# Patient Record
Sex: MALE | Race: WHITE | NOT HISPANIC OR LATINO | Employment: OTHER | URBAN - METROPOLITAN AREA
[De-identification: names, ages, dates, MRNs, and addresses within clinical notes are randomized per-mention and may not be internally consistent; named-entity substitution may affect disease eponyms.]

---

## 2020-01-14 ENCOUNTER — TRANSCRIBE ORDERS (OUTPATIENT)
Dept: ADMINISTRATIVE | Facility: HOSPITAL | Age: 65
End: 2020-01-14

## 2020-01-14 ENCOUNTER — APPOINTMENT (OUTPATIENT)
Dept: LAB | Facility: HOSPITAL | Age: 65
DRG: 948 | End: 2020-01-14
Payer: MEDICARE

## 2020-01-14 ENCOUNTER — HOSPITAL ENCOUNTER (OUTPATIENT)
Dept: CT IMAGING | Facility: HOSPITAL | Age: 65
Discharge: HOME | DRG: 948 | End: 2020-01-14
Payer: MEDICARE

## 2020-01-14 ENCOUNTER — APPOINTMENT (EMERGENCY)
Dept: RADIOLOGY | Facility: HOSPITAL | Age: 65
DRG: 948 | End: 2020-01-14
Payer: MEDICARE

## 2020-01-14 ENCOUNTER — HOSPITAL ENCOUNTER (INPATIENT)
Facility: HOSPITAL | Age: 65
LOS: 3 days | Discharge: NURSING, REHAB OR LONG-TERM CARE | DRG: 948 | End: 2020-01-17
Attending: EMERGENCY MEDICINE | Admitting: FAMILY MEDICINE
Payer: MEDICARE

## 2020-01-14 DIAGNOSIS — R07.9 CHEST PAIN, UNSPECIFIED TYPE: ICD-10-CM

## 2020-01-14 DIAGNOSIS — R53.1 GENERALIZED WEAKNESS: ICD-10-CM

## 2020-01-14 DIAGNOSIS — N18.30 CRD (CHRONIC RENAL DISEASE), STAGE III (HCC): Primary | ICD-10-CM

## 2020-01-14 DIAGNOSIS — I71.2 ASCENDING AORTIC ANEURYSM (HCC): ICD-10-CM

## 2020-01-14 DIAGNOSIS — R07.9 CHEST PAIN, UNSPECIFIED TYPE: Primary | ICD-10-CM

## 2020-01-14 DIAGNOSIS — R77.8 ELEVATED TROPONIN: ICD-10-CM

## 2020-01-14 DIAGNOSIS — I67.2 CEREBROVASCULAR DISEASE, ARTERIOSCLEROTIC, POST-STROKE: ICD-10-CM

## 2020-01-14 DIAGNOSIS — N18.30 CRD (CHRONIC RENAL DISEASE), STAGE III (HCC): ICD-10-CM

## 2020-01-14 DIAGNOSIS — R07.9 CHEST PAIN: Primary | ICD-10-CM

## 2020-01-14 DIAGNOSIS — N18.9 CHRONIC KIDNEY DISEASE: ICD-10-CM

## 2020-01-14 DIAGNOSIS — E78.5 DYSLIPIDEMIA, GOAL LDL BELOW 160: ICD-10-CM

## 2020-01-14 DIAGNOSIS — Z86.73 CEREBROVASCULAR DISEASE, ARTERIOSCLEROTIC, POST-STROKE: ICD-10-CM

## 2020-01-14 DIAGNOSIS — R07.9 CHEST PAIN, UNSPECIFIED: ICD-10-CM

## 2020-01-14 PROBLEM — F02.80: Status: ACTIVE | Noted: 2018-08-28

## 2020-01-14 LAB
ALBUMIN SERPL BCP-MCNC: 3.9 G/DL (ref 3.5–5)
ALP SERPL-CCNC: 142 U/L (ref 46–116)
ALT SERPL W P-5'-P-CCNC: 21 U/L (ref 12–78)
ALT SERPL W P-5'-P-CCNC: 23 U/L (ref 12–78)
ANION GAP SERPL CALCULATED.3IONS-SCNC: 9 MMOL/L (ref 4–13)
APTT PPP: 31 SECONDS (ref 23–37)
AST SERPL W P-5'-P-CCNC: 13 U/L (ref 5–45)
BASOPHILS # BLD AUTO: 0.02 THOUSANDS/ΜL (ref 0–0.1)
BASOPHILS # BLD AUTO: 0.02 THOUSANDS/ΜL (ref 0–0.1)
BASOPHILS NFR BLD AUTO: 0 % (ref 0–1)
BASOPHILS NFR BLD AUTO: 0 % (ref 0–1)
BILIRUB SERPL-MCNC: 0.82 MG/DL (ref 0.2–1)
BUN SERPL-MCNC: 23 MG/DL (ref 5–25)
BUN SERPL-MCNC: 23 MG/DL (ref 5–25)
CALCIUM SERPL-MCNC: 8.3 MG/DL (ref 8.3–10.1)
CHLORIDE SERPL-SCNC: 103 MMOL/L (ref 100–108)
CHOLEST SERPL-MCNC: 179 MG/DL (ref 50–200)
CO2 SERPL-SCNC: 28 MMOL/L (ref 21–32)
CREAT SERPL-MCNC: 1.25 MG/DL (ref 0.6–1.3)
CREAT SERPL-MCNC: 1.47 MG/DL (ref 0.6–1.3)
EOSINOPHIL # BLD AUTO: 0.11 THOUSAND/ΜL (ref 0–0.61)
EOSINOPHIL # BLD AUTO: 0.12 THOUSAND/ΜL (ref 0–0.61)
EOSINOPHIL NFR BLD AUTO: 2 % (ref 0–6)
EOSINOPHIL NFR BLD AUTO: 2 % (ref 0–6)
ERYTHROCYTE [DISTWIDTH] IN BLOOD BY AUTOMATED COUNT: 12.9 % (ref 11.6–15.1)
ERYTHROCYTE [DISTWIDTH] IN BLOOD BY AUTOMATED COUNT: 13.1 % (ref 11.6–15.1)
EST. AVERAGE GLUCOSE BLD GHB EST-MCNC: 123 MG/DL
FLUAV RNA NPH QL NAA+PROBE: NORMAL
FLUBV RNA NPH QL NAA+PROBE: NORMAL
GFR SERPL CREATININE-BSD FRML MDRD: 50 ML/MIN/1.73SQ M
GFR SERPL CREATININE-BSD FRML MDRD: 61 ML/MIN/1.73SQ M
GLUCOSE SERPL-MCNC: 99 MG/DL (ref 65–140)
HBA1C MFR BLD: 5.9 % (ref 4.2–6.3)
HCT VFR BLD AUTO: 38.7 % (ref 36.5–49.3)
HCT VFR BLD AUTO: 38.8 % (ref 36.5–49.3)
HDLC SERPL-MCNC: 39 MG/DL
HGB BLD-MCNC: 13 G/DL (ref 12–17)
HGB BLD-MCNC: 13.1 G/DL (ref 12–17)
IMM GRANULOCYTES # BLD AUTO: 0.01 THOUSAND/UL (ref 0–0.2)
IMM GRANULOCYTES # BLD AUTO: 0.02 THOUSAND/UL (ref 0–0.2)
IMM GRANULOCYTES NFR BLD AUTO: 0 % (ref 0–2)
IMM GRANULOCYTES NFR BLD AUTO: 0 % (ref 0–2)
INR PPP: 0.93 (ref 0.84–1.19)
LDLC SERPL CALC-MCNC: 114 MG/DL (ref 0–100)
LYMPHOCYTES # BLD AUTO: 0.97 THOUSANDS/ΜL (ref 0.6–4.47)
LYMPHOCYTES # BLD AUTO: 1.21 THOUSANDS/ΜL (ref 0.6–4.47)
LYMPHOCYTES NFR BLD AUTO: 21 % (ref 14–44)
LYMPHOCYTES NFR BLD AUTO: 21 % (ref 14–44)
MCH RBC QN AUTO: 29.4 PG (ref 26.8–34.3)
MCH RBC QN AUTO: 29.7 PG (ref 26.8–34.3)
MCHC RBC AUTO-ENTMCNC: 33.6 G/DL (ref 31.4–37.4)
MCHC RBC AUTO-ENTMCNC: 33.8 G/DL (ref 31.4–37.4)
MCV RBC AUTO: 88 FL (ref 82–98)
MCV RBC AUTO: 88 FL (ref 82–98)
MONOCYTES # BLD AUTO: 0.47 THOUSAND/ΜL (ref 0.17–1.22)
MONOCYTES # BLD AUTO: 0.51 THOUSAND/ΜL (ref 0.17–1.22)
MONOCYTES NFR BLD AUTO: 10 % (ref 4–12)
MONOCYTES NFR BLD AUTO: 9 % (ref 4–12)
NEUTROPHILS # BLD AUTO: 2.98 THOUSANDS/ΜL (ref 1.85–7.62)
NEUTROPHILS # BLD AUTO: 3.88 THOUSANDS/ΜL (ref 1.85–7.62)
NEUTS SEG NFR BLD AUTO: 67 % (ref 43–75)
NEUTS SEG NFR BLD AUTO: 68 % (ref 43–75)
NONHDLC SERPL-MCNC: 140 MG/DL
NRBC BLD AUTO-RTO: 0 /100 WBCS
NRBC BLD AUTO-RTO: 0 /100 WBCS
PLATELET # BLD AUTO: 227 THOUSANDS/UL (ref 149–390)
PLATELET # BLD AUTO: 234 THOUSANDS/UL (ref 149–390)
PMV BLD AUTO: 9.8 FL (ref 8.9–12.7)
PMV BLD AUTO: 9.9 FL (ref 8.9–12.7)
POTASSIUM SERPL-SCNC: 3.6 MMOL/L (ref 3.5–5.3)
PROT SERPL-MCNC: 7.9 G/DL (ref 6.4–8.2)
PROTHROMBIN TIME: 12.5 SECONDS (ref 11.6–14.5)
RBC # BLD AUTO: 4.41 MILLION/UL (ref 3.88–5.62)
RBC # BLD AUTO: 4.42 MILLION/UL (ref 3.88–5.62)
RSV RNA NPH QL NAA+PROBE: NORMAL
SODIUM SERPL-SCNC: 140 MMOL/L (ref 136–145)
TRIGL SERPL-MCNC: 128 MG/DL
TROPONIN I SERPL-MCNC: 0.56 NG/ML
TROPONIN I SERPL-MCNC: 0.57 NG/ML
TROPONIN I SERPL-MCNC: 0.62 NG/ML
WBC # BLD AUTO: 4.57 THOUSAND/UL (ref 4.31–10.16)
WBC # BLD AUTO: 5.75 THOUSAND/UL (ref 4.31–10.16)

## 2020-01-14 PROCEDURE — 84460 ALANINE AMINO (ALT) (SGPT): CPT

## 2020-01-14 PROCEDURE — 85610 PROTHROMBIN TIME: CPT | Performed by: EMERGENCY MEDICINE

## 2020-01-14 PROCEDURE — 85730 THROMBOPLASTIN TIME PARTIAL: CPT | Performed by: EMERGENCY MEDICINE

## 2020-01-14 PROCEDURE — 99285 EMERGENCY DEPT VISIT HI MDM: CPT

## 2020-01-14 PROCEDURE — 99222 1ST HOSP IP/OBS MODERATE 55: CPT | Performed by: FAMILY MEDICINE

## 2020-01-14 PROCEDURE — 36415 COLL VENOUS BLD VENIPUNCTURE: CPT

## 2020-01-14 PROCEDURE — 93005 ELECTROCARDIOGRAM TRACING: CPT

## 2020-01-14 PROCEDURE — 85025 COMPLETE CBC W/AUTO DIFF WBC: CPT

## 2020-01-14 PROCEDURE — 83036 HEMOGLOBIN GLYCOSYLATED A1C: CPT

## 2020-01-14 PROCEDURE — 80053 COMPREHEN METABOLIC PANEL: CPT | Performed by: EMERGENCY MEDICINE

## 2020-01-14 PROCEDURE — 82565 ASSAY OF CREATININE: CPT

## 2020-01-14 PROCEDURE — 84484 ASSAY OF TROPONIN QUANT: CPT | Performed by: FAMILY MEDICINE

## 2020-01-14 PROCEDURE — 85025 COMPLETE CBC W/AUTO DIFF WBC: CPT | Performed by: EMERGENCY MEDICINE

## 2020-01-14 PROCEDURE — 99285 EMERGENCY DEPT VISIT HI MDM: CPT | Performed by: EMERGENCY MEDICINE

## 2020-01-14 PROCEDURE — 80061 LIPID PANEL: CPT

## 2020-01-14 PROCEDURE — 84520 ASSAY OF UREA NITROGEN: CPT

## 2020-01-14 PROCEDURE — 71045 X-RAY EXAM CHEST 1 VIEW: CPT

## 2020-01-14 PROCEDURE — 84484 ASSAY OF TROPONIN QUANT: CPT | Performed by: EMERGENCY MEDICINE

## 2020-01-14 PROCEDURE — 84484 ASSAY OF TROPONIN QUANT: CPT

## 2020-01-14 PROCEDURE — 71275 CT ANGIOGRAPHY CHEST: CPT

## 2020-01-14 PROCEDURE — 87631 RESP VIRUS 3-5 TARGETS: CPT | Performed by: FAMILY MEDICINE

## 2020-01-14 RX ORDER — ASPIRIN 325 MG
1 TABLET ORAL
COMMUNITY
Start: 2019-10-31

## 2020-01-14 RX ORDER — VERAPAMIL HYDROCHLORIDE 300 MG/1
CAPSULE, EXTENDED RELEASE ORAL
COMMUNITY
Start: 2019-03-22

## 2020-01-14 RX ORDER — SERTRALINE HYDROCHLORIDE 100 MG/1
100 TABLET, FILM COATED ORAL DAILY
Status: DISCONTINUED | OUTPATIENT
Start: 2020-01-15 | End: 2020-01-17 | Stop reason: HOSPADM

## 2020-01-14 RX ORDER — ASPIRIN 81 MG/1
324 TABLET, CHEWABLE ORAL ONCE
Status: DISCONTINUED | OUTPATIENT
Start: 2020-01-14 | End: 2020-01-14

## 2020-01-14 RX ORDER — OXYBUTYNIN CHLORIDE 5 MG/1
5 TABLET, EXTENDED RELEASE ORAL DAILY
Status: DISCONTINUED | OUTPATIENT
Start: 2020-01-15 | End: 2020-01-17 | Stop reason: HOSPADM

## 2020-01-14 RX ORDER — MAGNESIUM HYDROXIDE/ALUMINUM HYDROXICE/SIMETHICONE 120; 1200; 1200 MG/30ML; MG/30ML; MG/30ML
30 SUSPENSION ORAL EVERY 6 HOURS PRN
Status: DISCONTINUED | OUTPATIENT
Start: 2020-01-14 | End: 2020-01-17 | Stop reason: HOSPADM

## 2020-01-14 RX ORDER — TAMSULOSIN HYDROCHLORIDE 0.4 MG/1
0.4 CAPSULE ORAL DAILY
COMMUNITY
Start: 2019-12-27

## 2020-01-14 RX ORDER — VERAPAMIL HYDROCHLORIDE 120 MG/1
300 TABLET, FILM COATED ORAL
Status: DISCONTINUED | OUTPATIENT
Start: 2020-01-14 | End: 2020-01-17 | Stop reason: HOSPADM

## 2020-01-14 RX ORDER — FAMOTIDINE 20 MG/1
20 TABLET, FILM COATED ORAL 2 TIMES DAILY
COMMUNITY
Start: 2020-01-07

## 2020-01-14 RX ORDER — ACETAMINOPHEN 325 MG/1
650 TABLET ORAL EVERY 6 HOURS PRN
Status: DISCONTINUED | OUTPATIENT
Start: 2020-01-14 | End: 2020-01-17 | Stop reason: HOSPADM

## 2020-01-14 RX ORDER — SERTRALINE HYDROCHLORIDE 100 MG/1
100 TABLET, FILM COATED ORAL DAILY
COMMUNITY
Start: 2019-11-18

## 2020-01-14 RX ORDER — ATORVASTATIN CALCIUM 80 MG/1
80 TABLET, FILM COATED ORAL DAILY
COMMUNITY
Start: 2020-01-10

## 2020-01-14 RX ORDER — CLOPIDOGREL BISULFATE 75 MG/1
75 TABLET ORAL ONCE
Status: COMPLETED | OUTPATIENT
Start: 2020-01-14 | End: 2020-01-14

## 2020-01-14 RX ORDER — FAMOTIDINE 20 MG/1
20 TABLET, FILM COATED ORAL DAILY
Status: DISCONTINUED | OUTPATIENT
Start: 2020-01-15 | End: 2020-01-17 | Stop reason: HOSPADM

## 2020-01-14 RX ORDER — ATORVASTATIN CALCIUM 40 MG/1
80 TABLET, FILM COATED ORAL
Status: DISCONTINUED | OUTPATIENT
Start: 2020-01-15 | End: 2020-01-17 | Stop reason: HOSPADM

## 2020-01-14 RX ORDER — SIMETHICONE 80 MG
80 TABLET,CHEWABLE ORAL 4 TIMES DAILY PRN
Status: DISCONTINUED | OUTPATIENT
Start: 2020-01-14 | End: 2020-01-17 | Stop reason: HOSPADM

## 2020-01-14 RX ORDER — MELATONIN
1000 DAILY
Status: DISCONTINUED | OUTPATIENT
Start: 2020-01-15 | End: 2020-01-17 | Stop reason: HOSPADM

## 2020-01-14 RX ORDER — TAMSULOSIN HYDROCHLORIDE 0.4 MG/1
0.4 CAPSULE ORAL DAILY
Status: DISCONTINUED | OUTPATIENT
Start: 2020-01-15 | End: 2020-01-17 | Stop reason: HOSPADM

## 2020-01-14 RX ORDER — HYDROXYZINE PAMOATE 25 MG/1
25 CAPSULE ORAL 2 TIMES DAILY
COMMUNITY
Start: 2018-05-26

## 2020-01-14 RX ORDER — CLOPIDOGREL BISULFATE 75 MG/1
TABLET ORAL
Status: ON HOLD | COMMUNITY
Start: 2019-11-21 | End: 2020-01-17 | Stop reason: CLARIF

## 2020-01-14 RX ORDER — HYDROXYZINE HYDROCHLORIDE 25 MG/1
25 TABLET, FILM COATED ORAL
Status: DISCONTINUED | OUTPATIENT
Start: 2020-01-14 | End: 2020-01-17 | Stop reason: HOSPADM

## 2020-01-14 RX ORDER — ONDANSETRON 2 MG/ML
4 INJECTION INTRAMUSCULAR; INTRAVENOUS EVERY 6 HOURS PRN
Status: DISCONTINUED | OUTPATIENT
Start: 2020-01-14 | End: 2020-01-17 | Stop reason: HOSPADM

## 2020-01-14 RX ORDER — MIRABEGRON 25 MG/1
25 TABLET, FILM COATED, EXTENDED RELEASE ORAL DAILY
COMMUNITY
Start: 2019-12-09

## 2020-01-14 RX ADMIN — IOHEXOL 85 ML: 350 INJECTION, SOLUTION INTRAVENOUS at 13:51

## 2020-01-14 RX ADMIN — VERAPAMIL HYDROCHLORIDE 300 MG: 120 TABLET, FILM COATED ORAL at 22:11

## 2020-01-14 RX ADMIN — HYDROXYZINE HYDROCHLORIDE 25 MG: 25 TABLET ORAL at 22:11

## 2020-01-14 RX ADMIN — CLOPIDOGREL BISULFATE 75 MG: 75 TABLET ORAL at 21:44

## 2020-01-14 NOTE — ED PROVIDER NOTES
History  Chief Complaint   Patient presents with    Chest Pain     pt states chest pain intermittently for the past couple of weeks  pt denies chest pain at this time  pt denies SOB, dizziness  states recently had cold like symptoms  pt was at PCP today and had blood work done and was called to come to ED     Patient is a 70-year-old male with history of hypertension prior TIAs and dementia presents the emergency department after being referred by PCP for chest pain the patient was seen in the office earlier today for the same has been going on for about a week per patient he denies any chest pain today states it was worse on Saturday felt like his chest was going to explode patient has a known history of ascending aortic aneurysm  Patient had elevated troponin the outpatient setting today and was referred in for further evaluation  No prior MI cardiac catheterization or stents  No active chest pain at this time  History provided by:  Patient  Chest Pain   Pain location:  Substernal area  Pain quality: pressure    Pain severity:  Moderate  Onset quality:  Gradual  Duration:  1 week  Timing:  Intermittent  Progression:  Waxing and waning  Chronicity:  New  Associated symptoms: no abdominal pain, no cough, no dizziness, no fatigue, no fever, no headache, no nausea, no numbness, no palpitations, no shortness of breath, not vomiting and no weakness        Prior to Admission Medications   Prescriptions Last Dose Informant Patient Reported? Taking?    Cholecalciferol 25 MCG (1000 UT) capsule 1/14/2020 at Unknown time  Yes Yes   Sig: Take 1,000 Units by mouth   MYRBETRIQ 25 MG TB24 1/14/2020 at Unknown time  Yes Yes   Sig: Take 25 mg by mouth daily   Multiple Vitamins-Iron (ONE DAILY MULTIVITAMIN/IRON) TABS 1/14/2020 at Unknown time  Yes Yes   Sig: Take 1 tablet by mouth   atorvastatin (LIPITOR) 80 mg tablet 1/14/2020 at Unknown time  Yes Yes   clopidogrel (PLAVIX) 75 mg tablet 1/14/2020 at Unknown time  Yes Yes Sig: TAKE 1 TABLET BY MOUTH ONCE DAILY REPLACES ASPIRIN - STOP TAKING IT   famotidine (PEPCID) 20 mg tablet 1/14/2020 at Unknown time  Yes Yes   hydrOXYzine pamoate (VISTARIL) 25 mg capsule 1/14/2020 at Unknown time  Yes Yes   Sig: Take 25 mg by mouth   sertraline (ZOLOFT) 100 mg tablet 1/14/2020 at Unknown time  Yes Yes   Sig: Take 100 mg by mouth daily   tamsulosin (FLOMAX) 0 4 mg 1/14/2020 at Unknown time  Yes Yes   Sig: Take 0 4 mg by mouth daily   verapamil (VERELAN) 300 MG CP24 1/13/2020 at Unknown time  Yes Yes   Sig: take 1 capsule by mouth at bedtime      Facility-Administered Medications: None       Past Medical History:   Diagnosis Date    Arthritis     Hypertension        Past Surgical History:   Procedure Laterality Date    JOINT REPLACEMENT         History reviewed  No pertinent family history  I have reviewed and agree with the history as documented  Social History     Tobacco Use    Smoking status: Current Every Day Smoker    Smokeless tobacco: Never Used   Substance Use Topics    Alcohol use: Never     Frequency: Never    Drug use: Never        Review of Systems   Constitutional: Negative for activity change, appetite change, chills, fatigue and fever  HENT: Negative for congestion, ear pain, rhinorrhea and sore throat  Eyes: Negative for discharge, redness and visual disturbance  Respiratory: Negative for cough, chest tightness, shortness of breath and wheezing  Cardiovascular: Positive for chest pain  Negative for palpitations  Gastrointestinal: Negative for abdominal pain, constipation, diarrhea, nausea and vomiting  Endocrine: Negative for polydipsia and polyuria  Genitourinary: Negative for difficulty urinating, dysuria, frequency, hematuria and urgency  Musculoskeletal: Negative for arthralgias and myalgias  Skin: Negative for color change, pallor and rash  Neurological: Negative for dizziness, weakness, light-headedness, numbness and headaches  Hematological: Negative for adenopathy  Does not bruise/bleed easily  All other systems reviewed and are negative  Physical Exam  Physical Exam   Constitutional: He is oriented to person, place, and time  He appears well-developed and well-nourished  HENT:   Head: Normocephalic and atraumatic  Right Ear: External ear normal    Left Ear: External ear normal    Nose: Nose normal    Mouth/Throat: Oropharynx is clear and moist    Eyes: Pupils are equal, round, and reactive to light  Conjunctivae and EOM are normal    Neck: Normal range of motion  Neck supple  Cardiovascular: Normal rate, regular rhythm, normal heart sounds and intact distal pulses  Pulmonary/Chest: Effort normal and breath sounds normal  No respiratory distress  He has no wheezes  He has no rales  He exhibits no tenderness  Abdominal: Soft  Bowel sounds are normal  He exhibits no distension  There is no tenderness  There is no guarding  Musculoskeletal: Normal range of motion  Neurological: He is alert and oriented to person, place, and time  No cranial nerve deficit or sensory deficit  Skin: Skin is warm and dry  Psychiatric: He has a normal mood and affect  Nursing note and vitals reviewed        Vital Signs  ED Triage Vitals [01/14/20 1701]   Temperature Pulse Respirations Blood Pressure SpO2   97 8 °F (36 6 °C) 72 22 150/76 95 %      Temp Source Heart Rate Source Patient Position - Orthostatic VS BP Location FiO2 (%)   Temporal Monitor Lying Left arm --      Pain Score       No Pain           Vitals:    01/14/20 1701 01/14/20 1730   BP: 150/76    Pulse: 72 69   Patient Position - Orthostatic VS: Lying          Visual Acuity      ED Medications  Medications - No data to display    Diagnostic Studies  Results Reviewed     Procedure Component Value Units Date/Time    Troponin I [[id number]]  (Abnormal) Collected:  01/14/20 1731    Lab Status:  Final result Specimen:  Blood from Arm, Left Updated:  01/14/20 1758     Troponin I 0 56 ng/mL     Comprehensive metabolic panel [[id number]]  (Abnormal) Collected:  01/14/20 1731    Lab Status:  Final result Specimen:  Blood from Arm, Left Updated:  01/14/20 1754     Sodium 140 mmol/L      Potassium 3 6 mmol/L      Chloride 103 mmol/L      CO2 28 mmol/L      ANION GAP 9 mmol/L      BUN 23 mg/dL      Creatinine 1 47 mg/dL      Glucose 99 mg/dL      Calcium 8 3 mg/dL      AST 13 U/L      ALT 21 U/L      Alkaline Phosphatase 142 U/L      Total Protein 7 9 g/dL      Albumin 3 9 g/dL      Total Bilirubin 0 82 mg/dL      eGFR 50 ml/min/1 73sq m     Narrative:       National Kidney Disease Foundation guidelines for Chronic Kidney Disease (CKD):     Stage 1 with normal or high GFR (GFR > 90 mL/min/1 73 square meters)    Stage 2 Mild CKD (GFR = 60-89 mL/min/1 73 square meters)    Stage 3A Moderate CKD (GFR = 45-59 mL/min/1 73 square meters)    Stage 3B Moderate CKD (GFR = 30-44 mL/min/1 73 square meters)    Stage 4 Severe CKD (GFR = 15-29 mL/min/1 73 square meters)    Stage 5 End Stage CKD (GFR <15 mL/min/1 73 square meters)  Note: GFR calculation is accurate only with a steady state creatinine    Protime-INR [[id number]]  (Normal) Collected:  01/14/20 1731    Lab Status:  Final result Specimen:  Blood from Arm, Left Updated:  01/14/20 1751     Protime 12 5 seconds      INR 0 93    APTT [[id number]]  (Normal) Collected:  01/14/20 1731    Lab Status:  Final result Specimen:  Blood from Arm, Left Updated:  01/14/20 1751     PTT 31 seconds     CBC and differential [[id number]] Collected:  01/14/20 1731    Lab Status:  Final result Specimen:  Blood from Arm, Left Updated:  01/14/20 1739     WBC 4 57 Thousand/uL      RBC 4 42 Million/uL      Hemoglobin 13 0 g/dL      Hematocrit 38 7 %      MCV 88 fL      MCH 29 4 pg      MCHC 33 6 g/dL      RDW 13 1 %      MPV 9 9 fL      Platelets 508 Thousands/uL      nRBC 0 /100 WBCs      Neutrophils Relative 67 %      Immat GRANS % 0 %      Lymphocytes Relative 21 % Monocytes Relative 10 %      Eosinophils Relative 2 %      Basophils Relative 0 %      Neutrophils Absolute 2 98 Thousands/µL      Immature Grans Absolute 0 02 Thousand/uL      Lymphocytes Absolute 0 97 Thousands/µL      Monocytes Absolute 0 47 Thousand/µL      Eosinophils Absolute 0 11 Thousand/µL      Basophils Absolute 0 02 Thousands/µL     Troponin I [[id number]]     Lab Status:  No result Specimen:  Blood                  XR chest 1 view portable    (Results Pending)              Procedures  ECG 12 Lead Documentation Only  Date/Time: 1/14/2020 5:14 PM  Performed by: Rhona Marin DO  Authorized by: Rhona Marin DO     ECG reviewed by me, the ED Provider: yes    Patient location:  ED  Quality:     Tracing quality:  Limited by artifact  Rate:     ECG rate:  71    ECG rate assessment: normal    Rhythm:     Rhythm: sinus rhythm    QRS:     QRS axis:  Normal  Conduction:     Conduction: normal    ST segments:     ST segments:  Non-specific  T waves:     T waves: non-specific               ED Course  ED Course as of Jan 14 1806 Tue Jan 14, 2020   1752 Spoke with Dr Matt Torre Cardiology on-call reviewed case and findings in the emergency he recommends admitting to hospitalist here trending troponins and he will see the patient in the hospital for further evaluation and treatment  1804 Second troponin is now lower than the earlier troponin today patient has been chest pain-free all day by his reports EKG nonischemic  Spoke with Cardiology recommend admitting patient here for now trending troponin and will see for further evaluation  [place] with Dr Misha Hobbs hospitalist on-call reviewed case and findings in the emergency department and Cardiology recommendations she accepts for admission                HEART Risk Score      Most Recent Value   History  0 Filed at: 01/14/2020 1748   ECG  1 Filed at: 01/14/2020 1748   Age  1 Filed at: 01/14/2020 1748   Risk Factors  1 Filed at: 01/14/2020 9207 Troponin  2 Filed at: 01/14/2020 1748   Heart Score Risk Calculator   History  0 Filed at: 01/14/2020 1748   ECG  1 Filed at: 01/14/2020 [address]   Age  1 Filed at: 01/14/2020 1748   Risk Factors  1 Filed at: 01/14/2020 1748   Troponin  2 Filed at: 01/14/2020 1748   HEART Score  5 Filed at: 01/14/2020 1748   HEART Score  5 Filed at: 01/14/2020 1748                            MDM  Number of Diagnoses or Management Options  Ascending aortic aneurysm [facility]): new and requires workup  Chest pain: new and requires workup  Chronic kidney disease: new and requires workup  Elevated troponin: new and requires workup     Amount and/or Complexity of Data Reviewed  Clinical lab tests: ordered and reviewed  Tests in the radiology section of CPT®: ordered and reviewed  Tests in the medicine section of CPT®: ordered and reviewed  Decide to obtain previous medical records or to obtain history from someone other than the patient: yes  Review and summarize past medical records: yes  Independent visualization of images, tracings, or specimens: yes    Risk of Complications, Morbidity, and/or Mortality  Presenting problems: moderate  Diagnostic procedures: moderate  Management options: moderate    Patient Progress  Patient progress: stable        Disposition  Final diagnoses:   Chest pain   Elevated troponin   Ascending aortic aneurysm ([facility] [address] )   Chronic kidney disease     Time reflects when diagnosis was documented in both MDM as applicable and the Disposition within this note     Time User Action Codes Description Comment    1/14/2020  5:01 PM Romy Lay Add [R07 9] Chest pain     1/14/2020  5:01 PM Romy Lay Add [R79 89] Elevated troponin     1/14/2020  5:01 PM Brad Liang Add [I71 2] Ascending aortic aneurysm ([facility] [address] )     1/14/2020  5:56 PM Romy Lay Add [N18 9] Chronic kidney disease       ED Disposition     ED Disposition Condition Date/Time Comment    Admit Stable Tue Jan 14, 2020  5:01 PM Case was discussed with Dr Adia Villanueva and the patient's admission status was agreed to be Admission Status: inpatient status to the service of Dr Omar Ordonez  Follow-up Information    None         Patient's Medications   Discharge Prescriptions    No medications on file     No discharge procedures on file      ED Provider  Electronically Signed by           Nika Marmolejo DO  01/14/20 9300

## 2020-01-15 ENCOUNTER — APPOINTMENT (INPATIENT)
Dept: NON INVASIVE DIAGNOSTICS | Facility: HOSPITAL | Age: 65
DRG: 948 | End: 2020-01-15
Payer: MEDICARE

## 2020-01-15 ENCOUNTER — APPOINTMENT (INPATIENT)
Dept: CT IMAGING | Facility: HOSPITAL | Age: 65
DRG: 948 | End: 2020-01-15
Payer: MEDICARE

## 2020-01-15 PROBLEM — R53.1 GENERALIZED WEAKNESS: Status: ACTIVE | Noted: 2020-01-15

## 2020-01-15 PROBLEM — R47.01 APHASIA: Status: ACTIVE | Noted: 2020-01-15

## 2020-01-15 LAB
ALBUMIN SERPL BCP-MCNC: 3.4 G/DL (ref 3.5–5)
ALP SERPL-CCNC: 130 U/L (ref 46–116)
ALT SERPL W P-5'-P-CCNC: 18 U/L (ref 12–78)
ANION GAP SERPL CALCULATED.3IONS-SCNC: 9 MMOL/L (ref 4–13)
AST SERPL W P-5'-P-CCNC: 13 U/L (ref 5–45)
ATRIAL RATE: 71 BPM
BILIRUB SERPL-MCNC: 0.68 MG/DL (ref 0.2–1)
BUN SERPL-MCNC: 22 MG/DL (ref 5–25)
CALCIUM SERPL-MCNC: 8.1 MG/DL (ref 8.3–10.1)
CHLORIDE SERPL-SCNC: 105 MMOL/L (ref 100–108)
CO2 SERPL-SCNC: 27 MMOL/L (ref 21–32)
CREAT SERPL-MCNC: 1.25 MG/DL (ref 0.6–1.3)
ERYTHROCYTE [DISTWIDTH] IN BLOOD BY AUTOMATED COUNT: 12.9 % (ref 11.6–15.1)
GFR SERPL CREATININE-BSD FRML MDRD: 61 ML/MIN/1.73SQ M
GLUCOSE SERPL-MCNC: 100 MG/DL (ref 65–140)
HCT VFR BLD AUTO: 36.4 % (ref 36.5–49.3)
HGB BLD-MCNC: 12.4 G/DL (ref 12–17)
MAGNESIUM SERPL-MCNC: 1.9 MG/DL (ref 1.6–2.6)
MCH RBC QN AUTO: 29.5 PG (ref 26.8–34.3)
MCHC RBC AUTO-ENTMCNC: 34.1 G/DL (ref 31.4–37.4)
MCV RBC AUTO: 87 FL (ref 82–98)
P AXIS: 22 DEGREES
PLATELET # BLD AUTO: 204 THOUSANDS/UL (ref 149–390)
PMV BLD AUTO: 9.8 FL (ref 8.9–12.7)
POTASSIUM SERPL-SCNC: 3.4 MMOL/L (ref 3.5–5.3)
PR INTERVAL: 132 MS
PROT SERPL-MCNC: 7.2 G/DL (ref 6.4–8.2)
QRS AXIS: 32 DEGREES
QRSD INTERVAL: 92 MS
QT INTERVAL: 410 MS
QTC INTERVAL: 445 MS
RBC # BLD AUTO: 4.21 MILLION/UL (ref 3.88–5.62)
SODIUM SERPL-SCNC: 141 MMOL/L (ref 136–145)
T WAVE AXIS: 67 DEGREES
TROPONIN I SERPL-MCNC: 0.56 NG/ML
VENTRICULAR RATE: 71 BPM
WBC # BLD AUTO: 5.54 THOUSAND/UL (ref 4.31–10.16)

## 2020-01-15 PROCEDURE — 99232 SBSQ HOSP IP/OBS MODERATE 35: CPT | Performed by: PHYSICIAN ASSISTANT

## 2020-01-15 PROCEDURE — 70450 CT HEAD/BRAIN W/O DYE: CPT

## 2020-01-15 PROCEDURE — 80053 COMPREHEN METABOLIC PANEL: CPT | Performed by: FAMILY MEDICINE

## 2020-01-15 PROCEDURE — 85027 COMPLETE CBC AUTOMATED: CPT | Performed by: FAMILY MEDICINE

## 2020-01-15 PROCEDURE — 93308 TTE F-UP OR LMTD: CPT

## 2020-01-15 PROCEDURE — 36415 COLL VENOUS BLD VENIPUNCTURE: CPT | Performed by: FAMILY MEDICINE

## 2020-01-15 PROCEDURE — 83735 ASSAY OF MAGNESIUM: CPT | Performed by: FAMILY MEDICINE

## 2020-01-15 RX ADMIN — ENOXAPARIN SODIUM 40 MG: 40 INJECTION SUBCUTANEOUS at 08:16

## 2020-01-15 RX ADMIN — SERTRALINE HYDROCHLORIDE 100 MG: 100 TABLET ORAL at 08:18

## 2020-01-15 RX ADMIN — ZINC 1 TABLET: TAB ORAL at 09:01

## 2020-01-15 RX ADMIN — OXYBUTYNIN CHLORIDE 5 MG: 5 TABLET, EXTENDED RELEASE ORAL at 08:19

## 2020-01-15 RX ADMIN — TAMSULOSIN HYDROCHLORIDE 0.4 MG: 0.4 CAPSULE ORAL at 08:18

## 2020-01-15 RX ADMIN — HYDROXYZINE HYDROCHLORIDE 25 MG: 25 TABLET ORAL at 22:20

## 2020-01-15 RX ADMIN — VITAMIN D, TAB 1000IU (100/BT) 1000 UNITS: 25 TAB at 08:19

## 2020-01-15 RX ADMIN — FAMOTIDINE 20 MG: 20 TABLET ORAL at 08:18

## 2020-01-15 RX ADMIN — VERAPAMIL HYDROCHLORIDE 300 MG: 120 TABLET, FILM COATED ORAL at 22:21

## 2020-01-15 RX ADMIN — ATORVASTATIN CALCIUM 80 MG: 40 TABLET, FILM COATED ORAL at 15:56

## 2020-01-15 NOTE — H&P
H&P- Delia Edge 1955, 62 y o  male MRN: [id number]    Unit/Bed#: ED 01 Encounter: [id number]    Primary Care Provider: Benitez Schmid MD   Date and time admitted to hospital: 1/14/2020  4:53 PM        * Chest pain  Assessment & Plan  · Patient gives history of 2-3 weeks of intermittent chest tightness  History obtained from his significant other who reported that chest tightness started since Saturday as for a she is aware of  · Patient was evaluated by PCP today noted to have slightly elevated troponin as into the emergency room  · No chest pain currently  · Emergency room discussed with the cardiologist-trend troponin  · Obtain echocardiogram  · Patient with risk factors including previous history of CVA PID hypertension and hyperlipidemia    Elevated troponin  Assessment & Plan  · Patient had elevated troponin as an outpatient  Troponin was elevated at 0 6 done as an outpatient and was sent to the emergency room    The most recent troponin is 0 57  · Trend troponin  · Telemetry  · Cardiology consultation  · EKG reviewed  · Repeat EKG in the morning    Hypertension  Assessment & Plan  · Patient is on verapamil  · Monitor blood pressure    Stage 3 chronic kidney disease ([facility] [address] )  Assessment & Plan  · Known history of CKD stage III  · Monitor creatinine    Dyslipidemia, goal LDL below 160  Assessment & Plan  · Continue with statin    GERD (gastroesophageal reflux disease)  Assessment & Plan  · Continue with PPI    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, without behavioral disturbance ([facility] [address] )  Assessment & Plan  · Patient with dementia likely secondary to vascular dementia given history of CVA  · Supportive care    Cerebrovascular disease, arteriosclerotic, post-stroke  Assessment & Plan  · Previous history of multiple CVA and TIA  · Continue with plavix  · Allergic to aspirin  · Patient with dysarthria which is at baseline    VTE Prophylaxis: Enoxaparin (Lovenox)  / sequential compression device   Code Status: full code  POLST: POLST form is not discussed and not completed at this time  Discussion with family:    Anticipated Length of Stay:  Patient will be admitted on an Inpatient basis with an anticipated length of stay of  >2 midnights  Justification for Hospital Stay:  Chest pain with elevated troponin    Total Time for Visit, including Counseling / Coordination of Care: 1 hour  Greater than 50% of this total time spent on direct patient counseling and coordination of care  Chief Complaint:   Chest pain    History of Present Illness:    Rodolfo Flower is a 62 y o  male who presents with edematous chest pain and chest tightness  Patient with previous history of CVA with dementia and dysarthria     Very difficult to understand  Patient reported that on and off he has been having some chest pain and chest tightness going on for the past 2-3 weeks  Do not give any history of exertional chest pain or shortness of breath  Significant other noticed that he was having some chest tightness and pain Saturday Sunday and Monday and today he was seen by his PCP who did the blood work noted to have elevated troponin and sent him to the hospital for further evaluation and workup  Significant other also noted that patient is weaker than his usual self and he needed to hold onto the stairs yesterday  Patient  with history of CVA in the past and multiple TIAs  Patient is allergic to aspirin with tongue swelling and currently on Plavix  Most of the history is obtained from patient's significant other  Currently patient denies any chest pain  Patient reported that the pain was bilateral in his ribcage  The patient is also with cough  Denies any sick contacts  Patient is a chronic smoker and still smokes about half a pack a day  Wife reported that patient has been coughing more frequently  Wife also noted that the patient started with diarrhea over the weekend  She believes he has a  Viral infection    She also noticed that the patient with decreased p o  Intake and he is weaker than usual    Review of Systems:    Review of Systems   Constitutional: Positive for activity change and fatigue  HENT: Negative  Eyes: Negative  Respiratory: Positive for cough  Cardiovascular: Positive for chest pain  Gastrointestinal: Positive for diarrhea  Endocrine: Negative  Genitourinary: Negative  Musculoskeletal: Positive for arthralgias  Skin: Negative  Neurological: Positive for weakness  Psychiatric/Behavioral: Negative  Review of system is rather limited due to patient's dementia    Past Medical and Surgical History:     Past Medical History:   Diagnosis Date    Arthritis     Hypertension     Stroke [facility])        Past Surgical History:   Procedure Laterality Date    JOINT REPLACEMENT         Meds/Allergies:    Prior to Admission medications    Medication Sig Start Date End Date Taking?  Authorizing Provider   atorvastatin (LIPITOR) 80 mg tablet  1/10/20  Yes Historical Provider, MD   Cholecalciferol 25 MCG (1000 UT) capsule Take 1,000 Units by mouth 6/19/17  Yes Historical Provider, MD   clopidogrel (PLAVIX) 75 mg tablet TAKE 1 TABLET BY MOUTH ONCE DAILY REPLACES ASPIRIN - STOP TAKING IT 11/21/19  Yes Historical Provider, MD   famotidine (PEPCID) 20 mg tablet  1/7/20  Yes Historical Provider, MD   hydrOXYzine pamoate (VISTARIL) 25 mg capsule Take 25 mg by mouth 5/26/18  Yes Historical Provider, MD   Multiple Vitamins-Iron (ONE DAILY MULTIVITAMIN/IRON) TABS Take 1 tablet by mouth 10/31/19  Yes Historical Provider, MD   MYRBETRIQ 25 MG TB24 Take 25 mg by mouth daily 12/9/19  Yes Historical Provider, MD   sertraline (ZOLOFT) 100 mg tablet Take 100 mg by mouth daily 11/18/19  Yes Historical Provider, MD   tamsulosin (FLOMAX) 0 4 mg Take 0 4 mg by mouth daily 12/27/19  Yes Historical Provider, MD   verapamil (VERELAN) 300 MG CP24 take 1 capsule by mouth at bedtime 3/22/19  Yes Historical Provider, MD I have reviewed home medications with patient personally  Allergies: Allergies   Allergen Reactions    Aspirin        Social History:     Marital Status: Single   Occupation: retired  Patient Pre-hospital Living Situation:  Lives at home  Patient Pre-hospital Level of Mobility:   Patient Pre-hospital Diet Restrictions:   Substance Use History:   Social History     Substance and Sexual Activity   Alcohol Use Never    Frequency: Never     Social History     Tobacco Use   Smoking Status Current Every Day Smoker    Packs/day: 0 50   Smokeless Tobacco Never Used     Social History     Substance and Sexual Activity   Drug Use Never       Family History:    Family History   Problem Relation Age of Onset    Cancer Mother     Cancer Father        Physical Exam:     Vitals:   Blood Pressure: 163/75 (01/14/20 1830)  Pulse: 77 (01/14/20 1830)  Temperature: 97 8 °F (36 6 °C) (01/14/20 1701)  Temp Source: Temporal (01/14/20 1701)  Respirations: 20 (01/14/20 1830)  Weight - Scale: 75 4 kg (166 lb 3 6 oz) (01/14/20 1701)  SpO2: 93 % (01/14/20 1830)    Physical Exam   Constitutional: He appears well-developed  No distress  HENT:   Head: Normocephalic  Eyes: Right eye exhibits no discharge  Left eye exhibits no discharge  Neck: No JVD present  Cardiovascular: Normal rate  No murmur heard  Pulmonary/Chest: Effort normal  He has no wheezes  He exhibits no tenderness  Decreased breath sounds bilateral  Cough present   Abdominal: Soft  Bowel sounds are normal    Musculoskeletal: Normal range of motion  Neurological: He is alert  Dysarthria   Skin: Skin is warm  Additional Data:     Lab Results: I have personally reviewed pertinent reports        Results from last 7 days   Lab Units 01/14/20  1731   WBC Thousand/uL 4 57   HEMOGLOBIN g/dL 13 0   HEMATOCRIT % 38 7   PLATELETS Thousands/uL 234   NEUTROS PCT % 67   LYMPHS PCT % 21   MONOS PCT % 10   EOS PCT % 2     Results from last 7 days   Lab Units 01/14/20  1731   SODIUM mmol/L 140   POTASSIUM mmol/L 3 6   CHLORIDE mmol/L 103   CO2 mmol/L 28   BUN mg/dL 23   CREATININE mg/dL 1 47*   ANION GAP mmol/L 9   CALCIUM mg/dL 8 3   ALBUMIN g/dL 3 9   TOTAL BILIRUBIN mg/dL 0 82   ALK PHOS U/L 142*   ALT U/L 21   AST U/L 13   GLUCOSE RANDOM mg/dL 99     Results from last 7 days   Lab Units 01/14/20  1731   INR  0 93         Results from last 7 days   Lab Units 01/14/20  1237   HEMOGLOBIN A1C % 5 9           Imaging: I have personally reviewed pertinent films in PACS    XR chest 1 view portable   Final Result by Thor Trivedi DO (01/14 1907)      Clear lungs  Cardiomediastinal silhouette appears mildly prominent which may be accentuated by portable technique and patient rotation  Patient also has reported mild ascending aortic aneurysm on earlier CTA chest on this date  Workstation performed: [id number]             EKG, Pathology, and Other Studies Reviewed on Admission:   · EKG:     Allscripts / Epic Records Reviewed: Yes     ** Please Note: This note has been constructed using a voice recognition system   ** 19.09

## 2020-01-15 NOTE — ASSESSMENT & PLAN NOTE
· Patient had elevated troponin as an outpatient  Troponin was elevated at 0 6 done as an outpatient and was sent to the emergency room    The most recent troponin is 0 57  · Trend troponin  · Telemetry  · Cardiology consultation  · EKG reviewed  · Repeat EKG in the morning

## 2020-01-15 NOTE — ED NOTES
Pt wouldn't wake up to eat his lunch, O2 sats kept fluctuating from 89%to96% placed pt on O2/2l/NC  Pt woke up and conversed about 3 min  Afterwards  Pt girlfriend at bedside       Laychristopher Banks RN  01/15/20 1764

## 2020-01-15 NOTE — ASSESSMENT & PLAN NOTE
· Previous history of multiple CVA and TIA  · Continue with plavix  · Allergic to aspirin  · Check CT head

## 2020-01-15 NOTE — ASSESSMENT & PLAN NOTE
· Patient gives history of 2-3 weeks of intermittent chest tightness and worsened on Sunday      · Patient was evaluated by PCP and noted to have slightly elevated troponin as into the emergency room  · No chest pain at this time  · Echocardiogram shows no regional wall motion abnormalities and normal EF  · Likely musculoskeletal chest pain

## 2020-01-15 NOTE — ASSESSMENT & PLAN NOTE
· Previous history of multiple CVA and TIA  · Continue with plavix  · Allergic to aspirin  · Patient with dysarthria which is at baseline

## 2020-01-15 NOTE — CONSULTS
Consultation - Cardiology   Chris Aguiar 62 y o  male MRN: [id number]  Unit/Bed#: ED 01 Encounter: [id number]    Assessment/Plan     Assessment:  1  Atypical chest pain - intermittent and suspect a MSK component  2  Elevated Troponin - not in an ACS pattern  3  HTN  4  HLP  5  CKD3  6  H/o CVA - previous CVA and TIA   - dysarthia  7  Dementia - vascular  8  PVD  9  Allergy to ASA     Plan:  1  Echocardiogram   - looking for LVEF and WMA  2  Given his underlying comorbid conditions recommend a conservative approach  3  Continue current therapy   - Plavix - given his ASA allergy   - high intensity statin therapy  4  With his h/o PAD it is not unreasonable to obtain an outpatient stress test for additional risk stratification   5  No need for full ACS treatment at this time    History of Present Illness   Physician Requesting Consult: Chepe Goetz MD  Reason for Consult / Principal Problem: CP  HPI: Chris Aguiar is a 62y o  year old male who presents with chest pain and elevated troponin  Pt follows with Dr Balta Fernandes at the [facility] site with a h/o CVA and TIAs causing dementia and dysarthria, HTN, HLP, CKD3, GERD and PAD  He was seen yesterday in the office with complaints of intermittent chest pain over the past few days which lead to an outpatient working including a troponin  His troponin was elevated leading to a referral to the ED for further evaluation  Information was obtain from the chart and the patient but his underlying dementia and dysarthria make obtaining a history difficult  He reports chest pain across his bilateral ribs lasting a few mins and occuring a few times over the past few weeks  Symptoms are not associated with SOB, do not radiate and occur randomly  He denies exertional symptoms  Last episode of chest pain was over this past weekend  He denies orthopnea, PND, LE edema, syncope or presyncope       At time of my exam, he was pain free and his significant other was not present to provide any additional information  Consults    Review of Systems     Review of Systems - General ROS: negative  Respiratory ROS: positive for - cough  Cardiovascular ROS: positive for - chest pain  Gastrointestinal ROS: negative  Musculoskeletal ROS: positive for - joint stiffness  Neurological ROS: positive for - weakness  Dermatological ROS: negative      Historical Information   Past Medical History:   Diagnosis Date    Arthritis     Hypertension     Stroke [facility])      Past Surgical History:   Procedure Laterality Date    JOINT REPLACEMENT       Social History     Substance and Sexual Activity   Alcohol Use Never    Frequency: Never     Social History     Substance and Sexual Activity   Drug Use Never     Social History     Tobacco Use   Smoking Status Current Every Day Smoker    Packs/day: 0 50   Smokeless Tobacco Never Used     Family History: non-contributory    Meds/Allergies   all current active meds have been reviewed  Allergies   Allergen Reactions    Aspirin        Objective   Vitals: Blood pressure 136/93, pulse 79, temperature 97 7 °F (36 5 °C), temperature source Oral, resp  rate 18, height 5' 4" (1 626 m), weight 75 4 kg (166 lb 3 6 oz), SpO2 95 %    Orthostatic Blood Pressures      Most Recent Value   Blood Pressure  136/93 filed at 01/15/2020 6578   Patient Position - Orthostatic VS  Sitting filed at 01/15/2020 0857          No intake or output data in the 24 hours ending 01/15/20 3485    Invasive Devices     Peripheral Intravenous Line            Peripheral IV 01/14/20 Left Antecubital less than 1 day                Physical Exam     Physical Examination: General appearance - alert, well appearing, and in no distress  Neck - carotids upstroke normal bilaterally, no bruits  Chest - clear to auscultation, no wheezes, rales or rhonchi, symmetric air entry  Heart - normal rate, regular rhythm, normal S1, S2, no murmurs, rubs, clicks or gallops  Abdomen - soft NT/ND  Neurological - alert, dysarthria otherwise non-focal  Extremities - no pedal edema noted  Skin - normal coloration and turgor, no rashes, no suspicious skin lesions noted    Lab Results:   CBC with diff:   Results from last 7 days   Lab Units 01/15/20  0609   WBC Thousand/uL 5 54   RBC Million/uL 4 21   HEMOGLOBIN g/dL 12 4   HEMATOCRIT % 36 4*   MCV fL 87   MCH pg 29 5   MCHC g/dL 34 1   RDW % 12 9   MPV fL 9 8   PLATELETS Thousands/uL 204     CMP:   Results from last 7 days   Lab Units 01/15/20  0609   SODIUM mmol/L 141   POTASSIUM mmol/L 3 4*   CHLORIDE mmol/L 105   CO2 mmol/L 27   BUN mg/dL 22   CREATININE mg/dL 1 25   CALCIUM mg/dL 8 1*   AST U/L 13   ALT U/L 18   ALK PHOS U/L 130*   EGFR ml/min/1 73sq m 61     Troponin:   0   Lab Value Date/Time    TROPONINI 0 56 (H) 01/14/2020 2333    TROPONINI 0 57 (H) 01/14/2020 2032    TROPONINI 0 56 (H) 01/14/2020 1731    TROPONINI 0 62 (H) 01/14/2020 1316     BNP:   Results from last 7 days   Lab Units 01/15/20  0609   POTASSIUM mmol/L 3 4*   CHLORIDE mmol/L 105   CO2 mmol/L 27   BUN mg/dL 22   CREATININE mg/dL 1 25   CALCIUM mg/dL 8 1*   EGFR ml/min/1 73sq m 61     Coags:   Results from last 7 days   Lab Units 01/14/20  1731   PTT seconds 31   INR  0 93     TSH:     Imaging: I have personally reviewed pertinent reports  CT Chest  IMPRESSION:     Ascending abdominal aortic aneurysm without evidence of dissection      Stable left adrenal adenoma     Right thyroid nodule measuring 11 x 9 mm      According to guidelines published in the February 2015 white paper on incidental thyroid nodules in the Journal of the [employer] of Radiology Andrew Ort), no further evaluation is necessary     The study was marked in [facility] for significant notification      EKG: SR without sig ST/T changes

## 2020-01-15 NOTE — ASSESSMENT & PLAN NOTE
· Patient with dementia likely secondary to vascular dementia given history of CVA  · Supportive care

## 2020-01-15 NOTE — ASSESSMENT & PLAN NOTE
· Patient gives history of 2-3 weeks of intermittent chest tightness    History obtained from his significant other who reported that chest tightness started since Saturday as for a she is aware of  · Patient was evaluated by PCP today noted to have slightly elevated troponin as into the emergency room  · No chest pain currently  · Emergency room discussed with the cardiologist-trend troponin  · Obtain echocardiogram  · Patient with risk factors including previous history of CVA PID hypertension and hyperlipidemia

## 2020-01-15 NOTE — ASSESSMENT & PLAN NOTE
· Patient with worsening expressive aphasia since Sunday  · Check CT head  · Likely worsening dementia  · Neuro checks

## 2020-01-15 NOTE — ED NOTES
Tourniquet found on patient prior to blood draw on left upper arm, circulation checked and was normal, color pink  Patient with no complaints of pain and moving limb with no problems  Will continue to monitor       Bennie Durand RN  01/14/20 2053

## 2020-01-15 NOTE — ASSESSMENT & PLAN NOTE
· Known history of CKD stage III    Lab Results   Component Value Date    CREATININE 1 25 01/15/2020    CREATININE 1 47 (H) 01/14/2020    CREATININE 1 25 01/14/2020

## 2020-01-15 NOTE — ED NOTES
Pt  Ambulated to restroom with assist x1  Denied SOB while ambulating  Oxygen left off pt  Per request  Pt  Resting in stretcher  O2 95% on RA       Mat Gonzales RN  01/15/20 9130

## 2020-01-15 NOTE — PROGRESS NOTES
[facility]  Progress Note - Rosie Daly 1955, 62 y o  male MRN: [id number]  Unit/Bed#: ED 01 Encounter: [id number]  Primary Care Provider: Kim Schmitt MD   Date and time admitted to hospital: 1/14/2020  4:53 PM    * Chest pain  Assessment & Plan  · Patient gives history of 2-3 weeks of intermittent chest tightness and worsened on Sunday  · Patient was evaluated by PCP and noted to have slightly elevated troponin as into the emergency room  · No chest pain at this time  · Echocardiogram shows no regional wall motion abnormalities and normal EF  · Likely musculoskeletal chest pain    Aphasia  Assessment & Plan  · Patient with worsening expressive aphasia since Sunday  · Check CT head  · Likely worsening dementia  · Neuro checks    Generalized weakness  Assessment & Plan  · PT and OT consult  · Lives at home with wife    Cerebrovascular disease, arteriosclerotic, post-stroke  Assessment & Plan  · Previous history of multiple CVA and TIA  · Continue with plavix  · Allergic to aspirin  · Check CT head    Hypertension  Assessment & Plan  · Patient is on verapamil  · /89  · PRN hydralazine    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, without behavioral disturbance ([facility] [address] )  Assessment & Plan  · Patient with dementia likely secondary to vascular dementia given history of CVA  · Supportive care    Dyslipidemia, goal LDL below 160  Assessment & Plan  · Continue with statin    GERD (gastroesophageal reflux disease)  Assessment & Plan  · Continue with PPI    Stage 3 chronic kidney disease ([facility] [address] )  Assessment & Plan  · Known history of CKD stage III    Lab Results   Component Value Date    CREATININE 1 25 01/15/2020    CREATININE 1 47 (H) 01/14/2020    CREATININE 1 25 01/14/2020         VTE Pharmacologic Prophylaxis:   Pharmacologic: Enoxaparin (Lovenox)  Mechanical VTE Prophylaxis in Place: Yes    Patient Centered Rounds: I have performed bedside rounds with nursing staff today      Discussions with Specialists or Other Care Team Provider: nursing cardio    Education and Discussions with Family / Patient: patient, wife at bedside    Time Spent for Care: 30 minutes  More than 50% of total time spent on counseling and coordination of care as described above  Current Length of Stay: 1 day(s)    Current Patient Status: Inpatient   Certification Statement: The patient will continue to require additional inpatient hospital stay due to CT head, PT eval    Discharge Plan: likely 24 hrs    Code Status: Level 1 - Full Code      Subjective:   Per patient's wife he developed worsening expressive aphasia and dysarthria, generalized weakness and chest pain on   Has some rib pain now but no chest pain    Objective:     Vitals:   Temp (24hrs), Av 7 °F (36 5 °C), Min:97 6 °F (36 4 °C), Max:98 3 °F (36 8 °C)    Temp:  [97 6 °F (36 4 °C)-98 3 °F (36 8 °C)] 98 3 °F (36 8 °C)  HR:  [57-97] 68  Resp:  [16-24] 17  BP: (121-182)/(67-93) 180/93  SpO2:  [90 %-98 %] 98 %  Body mass index is 28 53 kg/m²  Input and Output Summary (last 24 hours):     No intake or output data in the 24 hours ending 01/15/20 1609    Physical Exam:     Physical Exam   Constitutional: No distress  HENT:   Head: Normocephalic and atraumatic  Eyes: No scleral icterus  Cardiovascular: Normal rate, regular rhythm, normal heart sounds and intact distal pulses  No murmur heard  Pulmonary/Chest: Effort normal and breath sounds normal  No accessory muscle usage  No respiratory distress  He has no wheezes  He has no rales  NTTP   Abdominal: Soft  Bowel sounds are normal  He exhibits no distension  There is no tenderness  There is no rigidity, no rebound and no guarding  Musculoskeletal: He exhibits no edema  Neurological: He is alert  Expressive aphasia and dysarthria  Follows commands  Tongue midline  Finger-to-nose noted to be intact bilaterally without over shooting  Skin: Skin is warm and dry  No rash noted   He is not diaphoretic  No erythema  Psychiatric: He has a normal mood and affect  His behavior is normal    Nursing note and vitals reviewed  Additional Data:     Labs:    Results from last 7 days   Lab Units 01/15/20  0609 01/14/20  1731   WBC Thousand/uL 5 54 4 57   HEMOGLOBIN g/dL 12 4 13 0   HEMATOCRIT % 36 4* 38 7   PLATELETS Thousands/uL 204 234   NEUTROS PCT %  --  67   LYMPHS PCT %  --  21   MONOS PCT %  --  10   EOS PCT %  --  2     Results from last 7 days   Lab Units 01/15/20  0609   SODIUM mmol/L 141   POTASSIUM mmol/L 3 4*   CHLORIDE mmol/L 105   CO2 mmol/L 27   BUN mg/dL 22   CREATININE mg/dL 1 25   ANION GAP mmol/L 9   CALCIUM mg/dL 8 1*   ALBUMIN g/dL 3 4*   TOTAL BILIRUBIN mg/dL 0 68   ALK PHOS U/L 130*   ALT U/L 18   AST U/L 13   GLUCOSE RANDOM mg/dL 100     Results from last 7 days   Lab Units 01/14/20  1731   INR  0 93         Results from last 7 days   Lab Units 01/14/20  1237   HEMOGLOBIN A1C % 5 9           * I Have Reviewed All Lab Data Listed Above  * Additional Pertinent Lab Tests Reviewed:  All Labs Within Last 24 Hours Reviewed    Imaging:    Imaging Reports Reviewed Today Include: CXR, CTA  Imaging Personally Reviewed by Myself Includes:  none    Recent Cultures (last 7 days):           Last 24 Hours Medication List:     Current Facility-Administered Medications:  acetaminophen 650 mg Oral Q6H PRN Hafsa Zambrano MD   aluminum-magnesium hydroxide-simethicone 30 mL Oral Q6H PRN Hafsa Zambrano MD   atorvastatin 80 mg Oral Daily With Eleanor Garcia MD   cholecalciferol 1,000 Units Oral Daily Hafsa Zambrano MD   enoxaparin 40 mg Subcutaneous Daily Hafsa Zambrano MD   famotidine 20 mg Oral Daily Hafsa Zambrano MD   hydrOXYzine HCL 25 mg Oral HS Hafsa Zambrano MD   multivitamin stress formula 1 tablet Oral Daily Hafsa Zambrano MD   nicotine 1 patch Transdermal Daily Hafsa Zambrano MD   ondansetron 4 mg Intravenous Q6H PRN Hafsa Zambrano MD   oxybutynin 5 mg Oral Daily Hafsa Zambrano MD sertraline 100 mg Oral Daily Lexa Melendez MD   simethicone 80 mg Oral 4x Daily PRN Lexa Melendez MD   tamsulosin 0 4 mg Oral Daily Lexa Melendez MD   verapamil 300 mg Oral HS Lexa Melendez MD        Today, Patient Was Seen By: Kb Nevarez PA-C    ** Please Note: Dictation voice to text software may have been used in the creation of this document   **

## 2020-01-16 ENCOUNTER — TRANSCRIBE ORDERS (OUTPATIENT)
Dept: ADMINISTRATIVE | Facility: HOSPITAL | Age: 65
End: 2020-01-16

## 2020-01-16 DIAGNOSIS — N28.1 COMPLEX RENAL CYST: Primary | ICD-10-CM

## 2020-01-16 PROBLEM — R47.1 DYSARTHRIA: Status: ACTIVE | Noted: 2020-01-15

## 2020-01-16 PROCEDURE — 99232 SBSQ HOSP IP/OBS MODERATE 35: CPT | Performed by: PHYSICIAN ASSISTANT

## 2020-01-16 PROCEDURE — 97116 GAIT TRAINING THERAPY: CPT

## 2020-01-16 PROCEDURE — 97163 PT EVAL HIGH COMPLEX 45 MIN: CPT

## 2020-01-16 RX ADMIN — ATORVASTATIN CALCIUM 80 MG: 40 TABLET, FILM COATED ORAL at 19:02

## 2020-01-16 RX ADMIN — ENOXAPARIN SODIUM 40 MG: 40 INJECTION SUBCUTANEOUS at 08:25

## 2020-01-16 RX ADMIN — HYDROXYZINE HYDROCHLORIDE 25 MG: 25 TABLET ORAL at 21:57

## 2020-01-16 RX ADMIN — TAMSULOSIN HYDROCHLORIDE 0.4 MG: 0.4 CAPSULE ORAL at 08:25

## 2020-01-16 RX ADMIN — OXYBUTYNIN CHLORIDE 5 MG: 5 TABLET, EXTENDED RELEASE ORAL at 08:25

## 2020-01-16 RX ADMIN — ZINC 1 TABLET: TAB ORAL at 08:25

## 2020-01-16 RX ADMIN — FAMOTIDINE 20 MG: 20 TABLET ORAL at 08:25

## 2020-01-16 RX ADMIN — VERAPAMIL HYDROCHLORIDE 300 MG: 120 TABLET, FILM COATED ORAL at 21:57

## 2020-01-16 RX ADMIN — VITAMIN D, TAB 1000IU (100/BT) 1000 UNITS: 25 TAB at 08:25

## 2020-01-16 RX ADMIN — SERTRALINE HYDROCHLORIDE 100 MG: 100 TABLET ORAL at 08:25

## 2020-01-16 NOTE — ED NOTES
Paged Physician to ask about possibly discharging him from the ED instead of waiting for a bed upstairs   Waiting for reply     Rina Wayne RN  01/16/20 4369

## 2020-01-16 NOTE — ED NOTES
Dressing changed on IV left AC  Flushed well, no signs of infiltration       Nolan Arzate, RN  01/15/20 2043

## 2020-01-16 NOTE — PLAN OF CARE
Problem: PHYSICAL THERAPY ADULT  Goal: Performs mobility at highest level of function for planned discharge setting  See evaluation for individualized goals  Description  Treatment/Interventions: Functional transfer training, LE strengthening/ROM, Elevations, Endurance training, Therapeutic exercise, Patient/family training, Equipment eval/education, Bed mobility, Gait training, Compensatory technique education, Continued evaluation, Spoke to advanced practitioner, OT  Equipment Recommended: Mat Prader       See flowsheet documentation for full assessment, interventions and recommendations  Note:   Prognosis: Good  Problem List: Decreased strength, Decreased endurance, Decreased mobility, Impaired balance, Decreased coordination, Decreased safety awareness, Impaired judgement  Assessment: Pt is a 62 y o  male seen for PT evaluation s/p admission to [address] [place] [address] on 1/14/2020 with Chest pain  Pt with PMHx Dementia, PAD, CVA, CKD III, and dysarthria  Order placed for PT services  Upon evaluation: Pt is presenting with impaired functional mobility due to decreased strength, decreased endurance, impaired balance, impaired coordination, gait deviations, decreased safety awareness, impaired judgment and fall risk requiring supervision assistance for bed mobility, minimal assistance for transfers and moderate assistance for ambulation with spc   Pt's clinical presentation is currently unstable/unpredictable given the functional mobility deficits above, especially weakness, decreased endurance, impaired coordination, gait deviations, decreased activity tolerance, decreased functional mobility tolerance, decreased safety awareness and impaired judgement, coupled with fall risks as indicated by AM-PAC 6-Clicks: 53/15 as well as hx of falls, impulsivity, impaired balance, impaired coordination, impaired judgement and decreased safety awareness and combined with medical complications of abnormal renal lab values, abnormal CBC, abnormal potassium values, need for input for mobility technique/safety and elevated troponin  Pt's PMHx and comorbidities that may affect physical performance and progress include: CKD, HTN, CVA, Dementia and PAD  Personal factors affecting pt at time of IE include: multi-level environment, inability to perform ADLs, inability to navigate level surfaces without external assistance, inability to navigate community distances, limited insight into impairments and recent fall(s)/fall history  Pt will benefit from continued skilled PT services to address deficits as defined above and to maximize level of functional mobility to facilitate return toward PLOF and improved QOL  From PT/mobility standpoint, recommendation at time of d/c would be Short term rehab pending progress in order to reduce fall risk and maximize pt's functional independence and consistency with mobility in order to facilitate return to PLOF  Recommend trial with walker next 1-2 sessions and OT consult  Recommendation: Short-term skilled PT          See flowsheet documentation for full assessment

## 2020-01-16 NOTE — ASSESSMENT & PLAN NOTE
· Discussed in detail with the patients girlfriend of 29 years   This is more a subacute process as opposed to acute event  · Worsening ambulation also more subacute x 2 weeks  · Likely worsening underlying dementia  · Patient with prior trach as well which contributes to speech quality

## 2020-01-16 NOTE — SOCIAL WORK
CM placed call to pts , Adeel Thornton, to obtain information as to how pt was functioning prior to admission and to discuss d/c planning  No answer, left message asking for a return call

## 2020-01-16 NOTE — SOCIAL WORK
CM met with the patient at bedside to review the CM role and discuss possible dc needs  At time of interview pt is AAOx4, pt lives with his SO in an apartment in [facility] with 8 VINH  Pt was IPTA  Pt has DME of a cane and is ambulatory without assistance   Pt denies any history of drug/etoh abuse, mental illness or inpatient psych admissions  Pt denies any history of SNF/Rehab or VNA  Pt does not have a POA/LW  Pts SO is available for transportation needs  Preferred Pharmacy: [facility]  Contact: Jase Hardy [place], [phone]  PCP: Dr Brandee Saunders    CM reviewed d/c planning process including the following: identifying help at home, patient preference for d/c planning needs, availability of treatment team to discuss questions or concerns patient and/or family may have regarding understanding medications and recognizing signs and symptoms once discharged  CM also encouraged patient to follow up with all recommended appointments after discharge  Patient advised of importance for patient and family to participate in managing patients medical well being

## 2020-01-16 NOTE — ASSESSMENT & PLAN NOTE
· Previous history of multiple CVA and TIA  · Continue with plavix  · Allergic to aspirin  · Outpatient neuro follow up

## 2020-01-16 NOTE — PLAN OF CARE
Problem: PHYSICAL THERAPY ADULT  Goal: Performs mobility at highest level of function for planned discharge setting  See evaluation for individualized goals  Description  Treatment/Interventions: Functional transfer training, LE strengthening/ROM, Elevations, Endurance training, Therapeutic exercise, Patient/family training, Equipment eval/education, Bed mobility, Gait training, Compensatory technique education, Continued evaluation, Spoke to advanced practitioner, OT  Equipment Recommended: Lai Phelan       See flowsheet documentation for full assessment, interventions and recommendations  6/87/1030 2415 by Jarad Bourgeois PT  Outcome: Progressing  Note:   Prognosis: Good  Problem List: Decreased strength, Decreased endurance, Decreased mobility, Impaired balance, Decreased coordination, Decreased safety awareness, Impaired judgement   Pt requires minimal assistance to complete ambulation with RW with increased verbal cues for proper use of RW and safety  He continues to demonstrate ataxic gait pattern with decreased step length  He appears to have decreased insight to deficits at this time  He is limited by decreased balance, coordination and safety  Continue PT services to maximize LOF  Discussed Ax1 with nursing in ED for safety  Recommendation: Short-term skilled PT          See flowsheet documentation for full assessment

## 2020-01-16 NOTE — PHYSICAL THERAPY NOTE
PHYSICAL THERAPY EVALUATION  NAME:  Lauren Avina  DATE: 01/16/20    AGE:   62 y o  Mrn:   [id number]  ADMIT DX:  No admission diagnoses are documented for this encounter  Past Medical History:   Diagnosis Date    Arthritis     Hypertension     Stroke [facility])      Length Of Stay: 2  Performed at least 2 patient identifiers during session: Name and Birthday  PHYSICAL THERAPY EVALUATION :      01/16/20 0847   Note Type   Note type Eval/Treat   Pain Assessment   Pain Assessment No/denies pain   Pain Score No Pain   Home Living   Type of [address] [place] [address] Two level;Bed/bath upstairs  (no VINH, FF with R HR to 2nd floor bed and bath)   Bathroom Shower/Tub Tub/shower unit   [employer] Standard   Additional Comments Pt reports being (I) without AD PTA, but using spc prn  Reports being (I) with ADLs and has assistance from his girlfriend for IADLs  Reports girlfriend assist prn  Reports standing to shower in tub shower  Prior Function   Level of [place] Independent with ADLs and functional mobility   Lives With Significant other   Receives Help From Family   ADL Assistance Independent   IADLs Needs assistance  (girlfiremmanuel does cooking, cleaning, driving)   Falls in the last 6 months 1 to 4   Comments Pt wears glasses  Restrictions/Precautions   Other Precautions Telemetry; Fall Risk; Chair Alarm   General   Additional Pertinent History Pt with PMHx HTN, CKD III, Dementia, CVA and TIAs   Family/Caregiver Present No   Cognition   Following Commands Follows one step commands with increased time or repetition   RLE Assessment   RLE Assessment WFL  (strength 3+/5)   LLE Assessment   LLE Assessment WFL  (strength 3+/5)   Coordination   Movements are Fluid and Coordinated 0   Coordination and Movement Description decreased with toe tapping bilaterally and with ambulation noted ataxia   Light Touch   RLE Light Touch Grossly intact   LLE Light Touch Grossly intact   Bed Mobility   Supine to Sit 5 Supervision   Additional items Increased time required;Verbal cues  (min VCs for technique)   Sit to Supine 6  Modified independent   Additional Comments HOB flat without bedrail   Transfers   Sit to Stand 4  Minimal assistance   Additional items Assist x 1; Increased time required;Verbal cues  (wide SUDHA  VCs for balance upon standing)   Stand to Sit 4  Minimal assistance   Additional items Assist x 1; Impulsive;Verbal cues  (VCs for controlled descent)   Stand pivot 4  Minimal assistance  (w spc)   Additional items Assist x 1; Increased time required;Verbal cues  (pt reaching with external support)   Additional Comments transfers with spc with wide SUDHA and reaching for external support   Ambulation/Elevation   Gait pattern Ataxia; Wide SUDHA; Short stride; Inconsistent esteban   Gait Assistance 3  Moderate assist  (min to modAx1)   Additional items Assist x 1;Verbal cues  (VCs for increased step length)   Assistive Device [facility]   Distance 75'   Balance   Static Sitting Good   Dynamic Sitting Fair -  (donned L sock with steadying A)   Static Standing Poor +   Dynamic Standing Poor +   Ambulatory Poor   Activity Tolerance   Activity Tolerance Patient limited by fatigue   Medical Staff Made Aware Tyshawn LOMBARDI PA-C [address] RN   Assessment   Prognosis Good   Problem List Decreased strength;Decreased endurance;Decreased mobility; Impaired balance;Decreased coordination;Decreased safety awareness; Impaired judgement   Goals   Patient Goals "Go home "   STG Expiration Date 01/26/20   PT Treatment Day 1   Plan   Treatment/Interventions Functional transfer training;LE strengthening/ROM; Elevations; Endurance training; Therapeutic exercise;Patient/family training;Equipment eval/education; Bed mobility;Gait training; Compensatory technique education;Continued evaluation;Spoke to advanced practitioner;OT   PT Frequency Other (Comment)  (3-5x/week)   Recommendation   Recommendation Short-term skilled PT   Equipment Recommended Walker   Additional Comments [facility] 6 clicks 87/79     (Please find full objective findings from PT assessment regarding body systems outlined above)  Assessment: Pt is a 62 y o  male seen for PT evaluation s/p admission to [address] [place] [address] on 1/14/2020 with Chest pain  Pt with PMHx Dementia, PAD, CVA, CKD III, and dysarthria  Order placed for PT services  Upon evaluation: Pt is presenting with impaired functional mobility due to decreased strength, decreased endurance, impaired balance, impaired coordination, gait deviations, decreased safety awareness, impaired judgment and fall risk requiring supervision assistance for bed mobility, minimal assistance for transfers and moderate assistance for ambulation with spc  Pt's clinical presentation is currently unstable/unpredictable given the functional mobility deficits above, especially weakness, decreased endurance, impaired coordination, gait deviations, decreased activity tolerance, decreased functional mobility tolerance, decreased safety awareness and impaired judgement, coupled with fall risks as indicated by AM-PAC 6-Clicks: 54/60 as well as hx of falls, impulsivity, impaired balance, impaired coordination, impaired judgement and decreased safety awareness and combined with medical complications of abnormal renal lab values, abnormal CBC, abnormal potassium values, need for input for mobility technique/safety and elevated troponin  Pt's PMHx and comorbidities that may affect physical performance and progress include: CKD, HTN, CVA, Dementia and PAD  Personal factors affecting pt at time of IE include: multi-level environment, inability to perform ADLs, inability to navigate level surfaces without external assistance, inability to navigate community distances, limited insight into impairments and recent fall(s)/fall history   Pt will benefit from continued skilled PT services to address deficits as defined above and to maximize level of functional mobility to facilitate return toward PLOF and improved QOL  From PT/mobility standpoint, recommendation at time of d/c would be Short term rehab pending progress in order to reduce fall risk and maximize pt's functional independence and consistency with mobility in order to facilitate return to PLOF  Recommend trial with walker next 1-2 sessions and OT consult  Goals: Pt will: Perform bed mobility tasks with modified I to reposition in bed and prepare for transfers  Pt will perform transfers with modified I to increase Indep in home environment, decrease burden of care, return to multilevel home and decrease risk for falls and prepare for ambulation  Pt will ambulate with least restrictive AD for >/= 200' with  Supervision  to increase Indep in home environment, decrease burden of care, return to multilevel home, decrease risk for falls, improve activity tolerance and improve gait quality and to access home environment  Pt will complete >/= 12 steps with with unilateral handrail with Supervision to increase Indep in home environment, decrease burden of care, return to multilevel home and improve activity tolerance  Hue Matthews, PT,DPT         PHYSICAL THERAPY TREATMENT NOTE  Time IK:1612  Time [id number]  Total Time: 8'      S:  "I have been going to the bathroom"  O:  Initiated use of RW for balance and stability  Ambulated 100' with RW and minimal assistance  Verbal cues for increased step length, proper use of RW and to stay within SUDHA of RW with upright posture  A:  Pt requires minimal assistance to complete ambulation with RW with increased verbal cues for proper use of RW and safety  He continues to demonstrate ataxic gait pattern with decreased step length  He appears to have decreased insight to deficits at this time  He is limited by decreased balance, coordination and safety  Continue PT services to maximize LOF  Discussed Ax1 with nursing in ED for safety     P:  Recommend addition of therapeutic exercises to current functional mobility program  Ambulation with LRAD      Jarad Bourgeois, PT, DPT

## 2020-01-16 NOTE — ASSESSMENT & PLAN NOTE
· PT and OT consulted and recommended rehab  · He is very unsteady on his feet and worsening x 2 weeks  · More subacute process  · CT head: No acute intracranial hemorrhage or transcortical infarction  Chronic left MCA and right PICA distribution infarctions  Additional chronic lacunar infarctions noted within the bilateral deep gray matter and left cerebellum  Moderate cerebral chronic microangiopathic disease  Slightly disproportionate ventriculomegaly to sulcal prominence     · Outpatient neuro [facility] follow up

## 2020-01-16 NOTE — ED NOTES
Jacquelin message sent to admitting provider asking if patient needed morning labs drawn, none ordered at this time       Mandi Grossman RN  01/16/20 0763

## 2020-01-16 NOTE — PROGRESS NOTES
[address] Internal Medicine  Progress Note - Jenaro Reynoso 1955, 62 y o  male MRN: [id number]  Unit/Bed#: ED 01 Encounter: [id number]  Primary Care Provider: Michael Aparicio MD   Date and time admitted to hospital: 1/14/2020  4:53 PM    * Generalized weakness  Assessment & Plan  · PT and OT consulted and recommended rehab  · He is very unsteady on his feet and worsening x 2 weeks  · More subacute process  · CT head: No acute intracranial hemorrhage or transcortical infarction  Chronic left MCA and right PICA distribution infarctions  Additional chronic lacunar infarctions noted within the bilateral deep gray matter and left cerebellum  Moderate cerebral chronic microangiopathic disease  Slightly disproportionate ventriculomegaly to sulcal prominence  · Outpatient neuro [facility] follow up     Dysarthria  Assessment & Plan  · Discussed in detail with the patients girlfriend of 34 years  This is more a subacute process as opposed to acute event  · Worsening ambulation also more subacute x 2 weeks  · Likely worsening underlying dementia  · Patient with prior trach as well which contributes to speech quality    Chest pain  Assessment & Plan  · Patient gives history of 2-3 weeks of intermittent chest tightness and worsened on Sunday      · Patient was evaluated by PCP and noted to have slightly elevated troponin as into the emergency room  · No chest pain at this time  · Echocardiogram shows no regional wall motion abnormalities and normal EF  · Likely musculoskeletal chest pain    Cerebrovascular disease, arteriosclerotic, post-stroke  Assessment & Plan  · Previous history of multiple CVA and TIA  · Continue with plavix  · Allergic to aspirin  · Outpatient neuro follow up    Hypertension  Assessment & Plan  · Patient is on verapamil  · /90  · PRN hydralazine    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, without behavioral disturbance ([facility] [address] )  Assessment & Plan  · Patient with dementia likely secondary to vascular dementia given history of CVA  · Supportive care    Dyslipidemia, goal LDL below 160  Assessment & Plan  · Continue with statin    GERD (gastroesophageal reflux disease)  Assessment & Plan  · Continue with PPI    Stage 3 chronic kidney disease ([address] )  Assessment & Plan  · Known history of CKD stage III    Lab Results   Component Value Date    CREATININE 1 25 01/15/2020    CREATININE 1 47 (H) 2020    CREATININE 1 25 2020         VTE Pharmacologic Prophylaxis:   Pharmacologic: Enoxaparin (Lovenox)  Mechanical VTE Prophylaxis in Place: Yes    Patient Centered Rounds: I have performed bedside rounds with nursing staff today  Discussions with Specialists or Other Care Team Provider: PT    Education and Discussions with Family / Patient: girlfriend of 29 years    Time Spent for Care: 30 minutes  More than 50% of total time spent on counseling and coordination of care as described above  Current Length of Stay: 2 day(s)    Current Patient Status: Inpatient   Certification Statement: The patient will continue to require additional inpatient hospital stay due to await rehab    Discharge Plan: likely to rehab  Tomorrow, OT consult    Code Status: Level 1 - Full Code      Subjective:   Discussed in detail with the patient's girlfriend of 29 years  They live together at home blood over the past 2 weeks he has had more difficulty with ambulating, requiring more assistance  On  he was noted to complain of chest pain however initially she told me that this was in association with worsening speech and weakness however upon further discussion today, the worsening speech and weakness appears to be more subacute and likely over the past 2 weeks with the chest pain itself occurring on       Patient frustrated about recommendation for rehab  Denies CP    Objective:     Vitals:   Temp (24hrs), Av 6 °F (36 4 °C), Min:97 6 °F (36 4 °C), Max:97 6 °F (36 4 °C)    Temp:  [97 6 °F (36 4 °C)] 97 6 °F (36 4 °C)  HR:  [69-88] 70  Resp:  [18-20] 20  BP: (106-201)/(65-90) 161/90  SpO2:  [95 %-98 %] 98 %  Body mass index is 28 53 kg/m²  Input and Output Summary (last 24 hours):     No intake or output data in the 24 hours ending 01/16/20 1601    Physical Exam:     Physical Exam   Constitutional: No distress  HENT:   Head: Normocephalic and atraumatic  Eyes: No scleral icterus  Cardiovascular: Normal rate, regular rhythm, normal heart sounds and intact distal pulses  No murmur heard  Pulmonary/Chest: Effort normal and breath sounds normal  No accessory muscle usage  No respiratory distress  He has no wheezes  He has no rales  Abdominal: Soft  Bowel sounds are normal  He exhibits no distension  There is no tenderness  There is no rigidity, no rebound and no guarding  Musculoskeletal: He exhibits no edema  Neurological: He is alert  Dysarthria  Follows commands  Tongue midline  F-N intact B/L   Skin: Skin is warm and dry  No rash noted  He is not diaphoretic  No erythema  Psychiatric: He has a normal mood and affect  His behavior is normal    Nursing note and vitals reviewed      Additional Data:     Labs:    Results from last 7 days   Lab Units 01/15/20  0609 01/14/20  1731   WBC Thousand/uL 5 54 4 57   HEMOGLOBIN g/dL 12 4 13 0   HEMATOCRIT % 36 4* 38 7   PLATELETS Thousands/uL 204 234   NEUTROS PCT %  --  67   LYMPHS PCT %  --  21   MONOS PCT %  --  10   EOS PCT %  --  2     Results from last 7 days   Lab Units 01/15/20  0609   SODIUM mmol/L 141   POTASSIUM mmol/L 3 4*   CHLORIDE mmol/L 105   CO2 mmol/L 27   BUN mg/dL 22   CREATININE mg/dL 1 25   ANION GAP mmol/L 9   CALCIUM mg/dL 8 1*   ALBUMIN g/dL 3 4*   TOTAL BILIRUBIN mg/dL 0 68   ALK PHOS U/L 130*   ALT U/L 18   AST U/L 13   GLUCOSE RANDOM mg/dL 100     Results from last 7 days   Lab Units 01/14/20  1731   INR  0 93         Results from last 7 days   Lab Units 01/14/20  1237   HEMOGLOBIN A1C % 5 9               * I Have Reviewed All Lab Data Listed Above  * Additional Pertinent Lab Tests Reviewed: All Labs Within Last 24 Hours Reviewed    Imaging:    Imaging Reports Reviewed Today Include: CT  Imaging Personally Reviewed by Myself Includes:  none    Recent Cultures (last 7 days):           Last 24 Hours Medication List:     Current Facility-Administered Medications:  acetaminophen 650 mg Oral Q6H PRN Etta Saini MD   aluminum-magnesium hydroxide-simethicone 30 mL Oral Q6H PRN Etta Saini MD   atorvastatin 80 mg Oral Daily With Lorenzo Zaragoza MD   cholecalciferol 1,000 Units Oral Daily Etta Saini MD   enoxaparin 40 mg Subcutaneous Daily Etta Saini MD   famotidine 20 mg Oral Daily Etta Saini MD   hydrOXYzine HCL 25 mg Oral HS Etta Saini MD   multivitamin stress formula 1 tablet Oral Daily Etta Saini MD   nicotine 1 patch Transdermal Daily Etta Saini MD   ondansetron 4 mg Intravenous Q6H PRN Etta Saini MD   oxybutynin 5 mg Oral Daily Etta Saini MD   sertraline 100 mg Oral Daily Etta Saini MD   simethicone 80 mg Oral 4x Daily PRN Etta Saini MD   tamsulosin 0 4 mg Oral Daily Etta Saini MD   verapamil 300 mg Oral HS Etta Saini MD        Today, Patient Was Seen By: Jes Echavarria PA-C    ** Please Note: Dictation voice to text software may have been used in the creation of this document   **

## 2020-01-16 NOTE — SOCIAL WORK
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

## 2020-01-17 VITALS
SYSTOLIC BLOOD PRESSURE: 123 MMHG | WEIGHT: 164.24 LBS | RESPIRATION RATE: 18 BRPM | DIASTOLIC BLOOD PRESSURE: 78 MMHG | HEART RATE: 86 BPM | BODY MASS INDEX: 28.04 KG/M2 | HEIGHT: 64 IN | TEMPERATURE: 98.7 F | OXYGEN SATURATION: 95 %

## 2020-01-17 PROCEDURE — 99239 HOSP IP/OBS DSCHRG MGMT >30: CPT | Performed by: PHYSICIAN ASSISTANT

## 2020-01-17 PROCEDURE — 97167 OT EVAL HIGH COMPLEX 60 MIN: CPT

## 2020-01-17 RX ORDER — ACETAMINOPHEN 325 MG/1
650 TABLET ORAL EVERY 6 HOURS PRN
Qty: 30 TABLET | Refills: 0
Start: 2020-01-17

## 2020-01-17 RX ORDER — CLOPIDOGREL BISULFATE 75 MG/1
75 TABLET ORAL DAILY
COMMUNITY

## 2020-01-17 RX ADMIN — OXYBUTYNIN CHLORIDE 5 MG: 5 TABLET, EXTENDED RELEASE ORAL at 09:37

## 2020-01-17 RX ADMIN — SERTRALINE HYDROCHLORIDE 100 MG: 100 TABLET ORAL at 09:37

## 2020-01-17 RX ADMIN — ATORVASTATIN CALCIUM 80 MG: 40 TABLET, FILM COATED ORAL at 16:40

## 2020-01-17 RX ADMIN — ZINC 1 TABLET: TAB ORAL at 09:37

## 2020-01-17 RX ADMIN — ENOXAPARIN SODIUM 40 MG: 40 INJECTION SUBCUTANEOUS at 09:38

## 2020-01-17 RX ADMIN — VITAMIN D, TAB 1000IU (100/BT) 1000 UNITS: 25 TAB at 09:37

## 2020-01-17 RX ADMIN — TAMSULOSIN HYDROCHLORIDE 0.4 MG: 0.4 CAPSULE ORAL at 09:37

## 2020-01-17 RX ADMIN — FAMOTIDINE 20 MG: 20 TABLET ORAL at 09:37

## 2020-01-17 NOTE — PLAN OF CARE
Problem: Potential for Falls  Goal: Patient will remain free of falls  Description  INTERVENTIONS:  - Assess patient frequently for physical needs  -  Identify cognitive and physical deficits and behaviors that affect risk of falls    -  [place] fall precautions as indicated by assessment   - Educate patient/family on patient safety including physical limitations  - Instruct patient to call for assistance with activity based on assessment  - Modify environment to reduce risk of injury  - Consider OT/PT consult to assist with strengthening/mobility  Outcome: Not Progressing     Problem: PAIN - ADULT  Goal: Verbalizes/displays adequate comfort level or baseline comfort level  Description  Interventions:  - Encourage patient to monitor pain and request assistance  - Assess pain using appropriate pain scale  - Administer analgesics based on type and severity of pain and evaluate response  - Implement non-pharmacological measures as appropriate and evaluate response  - Consider cultural and social influences on pain and pain management  - Notify physician/advanced practitioner if interventions unsuccessful or patient reports new pain  Outcome: Not Progressing     Problem: SAFETY ADULT  Goal: Maintain or return to baseline ADL function  Description  INTERVENTIONS:  -  Assess patient's ability to carry out ADLs; assess patient's baseline for ADL function and identify physical deficits which impact ability to perform ADLs (bathing, care of mouth/teeth, toileting, grooming, dressing, etc )  - Assess/evaluate cause of self-care deficits   - Assess range of motion  - Assess patient's mobility; develop plan if impaired  - Assess patient's need for assistive devices and provide as appropriate  - Encourage maximum independence but intervene and supervise when necessary  - Involve family in performance of ADLs  - Assess for home care needs following discharge   - Consider OT consult to assist with ADL evaluation and planning for discharge  - Provide patient education as appropriate  Outcome: Not Progressing  Goal: Maintain or return mobility status to optimal level  Description  INTERVENTIONS:  - Assess patient's baseline mobility status (ambulation, transfers, stairs, etc )    - Identify cognitive and physical deficits and behaviors that affect mobility  - Identify mobility aids required to assist with transfers and/or ambulation (gait belt, sit-to-stand, lift, walker, cane, etc )  - [place] fall precautions as indicated by assessment  - Record patient progress and toleration of activity level on Mobility SBAR; progress patient to next Phase/Stage  - Instruct patient to call for assistance with activity based on assessment  - Consider rehabilitation consult to assist with strengthening/weightbearing, etc   Outcome: Not Progressing     Problem: Knowledge Deficit  Goal: Patient/family/caregiver demonstrates understanding of disease process, treatment plan, medications, and discharge instructions  Description  Complete learning assessment and assess knowledge base  Interventions:  - Provide teaching at level of understanding  - Provide teaching via preferred learning methods  Outcome: Not Progressing     Problem: NEUROSENSORY - ADULT  Goal: Achieves stable or improved neurological status  Description  INTERVENTIONS  - Monitor and report changes in neurological status  - Monitor vital signs such as temperature, blood pressure, glucose, and any other labs ordered   - Initiate measures to prevent increased intracranial pressure  - Monitor for seizure activity and implement precautions if appropriate      Outcome: Not Progressing  Goal: Achieves maximal functionality and self care  Description  INTERVENTIONS  - Monitor swallowing and airway patency with patient fatigue and changes in neurological status  - Encourage and assist patient to increase activity and self care     - Encourage visually impaired, hearing impaired and aphasic patients to use assistive/communication devices  Outcome: Not Progressing     Problem: CARDIOVASCULAR - ADULT  Goal: Maintains optimal cardiac output and hemodynamic stability  Description  INTERVENTIONS:  - Monitor I/O, vital signs and rhythm  - Monitor for S/S and trends of decreased cardiac output  - Administer and titrate ordered vasoactive medications to optimize hemodynamic stability  - Assess quality of pulses, skin color and temperature  - Assess for signs of decreased coronary artery perfusion  - Instruct patient to report change in severity of symptoms  Outcome: Not Progressing  Goal: Absence of cardiac dysrhythmias or at baseline rhythm  Description  INTERVENTIONS:  - Continuous cardiac monitoring, vital signs, obtain 12 lead EKG if ordered  - Administer antiarrhythmic and heart rate control medications as ordered  - Monitor electrolytes and administer replacement therapy as ordered  Outcome: Not Progressing     Problem: MUSCULOSKELETAL - ADULT  Goal: Maintain or return mobility to safest level of function  Description  INTERVENTIONS:  - Assess patient's ability to carry out ADLs; assess patient's baseline for ADL function and identify physical deficits which impact ability to perform ADLs (bathing, care of mouth/teeth, toileting, grooming, dressing, etc )  - Assess/evaluate cause of self-care deficits   - Assess range of motion  - Assess patient's mobility  - Assess patient's need for assistive devices and provide as appropriate  - Encourage maximum independence but intervene and supervise when necessary  - Involve family in performance of ADLs  - Assess for home care needs following discharge   - Consider OT consult to assist with ADL evaluation and planning for discharge  - Provide patient education as appropriate  Outcome: Not Progressing

## 2020-01-17 NOTE — SOCIAL WORK
Pt case was reviewed during CM rounds this am  Pt is medically stable for d/c today for STR  CM f/u with STR referral placed yesterday  [facility] is unable to accept as they have no bed available  CM contacted pt SO to get additional choices  SO is interested in pt going to [facility] referral sent  [place] is able to accept pt  CM will update SO and treatment team prior to calling for transportation

## 2020-01-17 NOTE — SOCIAL WORK
CM contacted [facility] for transportation ([phone])  Pt will be p/u at 6:30pm by [facility]  CMN completed and on chart for transportation crew  CM notified attending LINDA Reynolds, bedside RN Duane Pena, facility, pt and SO of transport time  CM to remain available until d/c

## 2020-01-17 NOTE — TRANSPORTATION MEDICAL NECESSITY
Section I - General Information    Name of Patient: Marilyn Wagner                 : 1955    Medicare #: [id number]  Transport Date: 20 (PCS is valid for round trips on this date and for all repetitive trips in the 60-day range as noted below )  Origin: [address]: [place]  Is the pt's stay covered under Medicare Part A (PPS/DRG)   []     Closest appropriate facility? If no, why is transport to more distant facility required? Yes  If hospice pt, is this transport related to pt's terminal illness? NA       Section II - Medical Necessity Questionnaire  Ambulance transportation is medically necessary only if other means of transport are contraindicated or would be potentially harmful to the patient  To meet this requirement, the patient must either be "bed confined" or suffer from a condition such that transport by means other than ambulance is contraindicated by the patient's condition  The following questions must be answered by the medical professional signing below for this form to be valid:    1)  Describe the MEDICAL CONDITION (physical and/or mental) of this patient AT [address] [place] [address] that requires the patient to be transported in an ambulance and why transport by other means is contraindicated by the patient's condition: Dx dementia, impulsive, unable to verbally communicate needs, poor balance,     2) Is the patient "bed confined" as defined below? No  To be "be confined" the patient must satisfy all three of the following conditions: (1) unable to get up from bed without Assistance; AND (2) unable to ambulate; AND (3) unable to sit in a chair or wheelchair  3) Can this patient safely be transported by car or wheelchair van (i e , seated during transport without a medical attendant or monitoring)?    No    4) In addition to completing questions 1-3 above, please check any of the following conditions that apply*:   *Note: supporting documentation for any boxes checked must be maintained in the patient's medical records  If hosp-hosp transfer, describe services needed at 2nd facility not available at 1st facility? Patient is confused  Medical attendant required   Other(specify) unable to verbally communicate needs, impulsive       Section III - Signature of Physician or Healthcare Professional  I certify that the above information is true and correct based on my evaluation of this patient, and represent that the patient requires transport by ambulance and that other forms of transport are contraindicated  I understand that this information will be used by the Centers for Medicare and Medicaid Services (CMS) to support the determination of medical necessity for ambulance services, and I represent that I have personal knowledge of the patient's condition at time of transport  []  If this box is checked, I also certify that the patient is physically or mentally incapable of signing the ambulance service's claim and that the institution with which I am affiliated has furnished care, services, or assistance to the patient  My signature below is made on behalf of the patient pursuant to 42 CFR §424 36(b)(4)  In accordance with 42 CFR §424 37, the specific reason(s) that the patient is physically or mentally incapable of signing the claim form is as follows: Leonel Post of Physician* or Healthcare Professional______________________________________________________________  Signature Date 01/17/20 (For scheduled repetitive transports, this form is not valid for transports performed more than 60 days after this date)    Printed Name & Credentials of Physician or Healthcare Professional (MD, DO, RN, etc )________________________________Hellen Madden, NILAY, LSW  *Form must be signed by patient's attending physician for scheduled, repetitive transports   For non-repetitive, unscheduled ambulance transports, if unable to obtain the signature of the attending physician, any of the following may sign (choose appropriate option below)  [] Physician Assistant []  Clinical Nurse Specialist []  Registered Nurse  []  Nurse Practitioner  [x] Discharge Planner

## 2020-01-17 NOTE — OCCUPATIONAL THERAPY NOTE
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)      Past Surgical History  Past Surgical History:   Procedure Laterality Date    JOINT REPLACEMENT               01/17/20 0917   Note Type   Note type Eval only   Pain Assessment   Pain Assessment No/denies pain   Pain Score No Pain   Home Living   Type of Home Apartment  (e)   Home Layout Two level;Bed/bath upstairs  (no VINH FF to 2nd floor )   Bathroom Shower/Tub Tub/shower unit   Bathroom Toilet Standard   Bathroom Equipment Grab bars in shower   [address] Wheelchair-manual;Cane;Walker  (2 wc)   Prior Function   Level of [place] Independent with ADLs and functional mobility; Needs assistance with IADLs   Lives With Significant other   Receives Help From Family   ADL Assistance Independent   IADLs Needs assistance  (girlfriend cooks, cleans drives)   Falls in the last 6 months 1 to 4   Vocational Retired  (maintence worker)   Comments PTA pt living with girlfriend in a townhouse, with bed/bath up a full flight of stairs  Pt reports using a cane at baseline  I with ADLs requires A with IADLs  (-)drives, +falls    Lifestyle   Autonomy PTA pt living with girlfriend in a townhouse, with bed/bath up a full flight of stairs  Pt reports using a cane at baseline   I with ADLs requires A with IADLs  (-)drives, +falls    Reciprocal Relationships girlfriend   Service to Others retired maintence worker   Intrinsic Gratification watching tv    Psychosocial   Psychosocial (WDL) WDL   Subjective   Subjective "I do what I can do"    ADL   Where Assessed Edge of bed   Eating Assistance 6  Modified independent   Grooming Assistance 5  Supervision/Setup   UB Bathing Assistance 5  Supervision/Setup   LB Bathing Assistance 4  [facility] [address] 5  Supervision/Setup   LB Dressing Assistance 4  [facility]  4  Minimal Assistance   Bed Mobility   Supine to Sit 5  Supervision   Additional items Increased time required;Verbal cues; Bedrails   Additional Comments Seated in chair at the end of the session   Transfers   Sit to Stand 4  Minimal assistance   Additional items Assist x 1; Increased time required;Verbal cues; Bedrails   Stand to Sit 4  Minimal assistance   Additional items Assist x 1; Increased time required;Verbal cues;Armrests   Stand pivot 4  Minimal assistance   Additional items Assist x 1; Increased time required;Verbal cues   Additional Comments max VC for safety   Functional Mobility   Functional Mobility 4  Minimal assistance   Additional Comments Ax1; with VC for safety and use of RW   Additional items Rolling walker   Balance   Static Sitting Good   Dynamic Sitting Fair -   Static Standing Poor +   Dynamic Standing Poor +   Ambulatory Poor   Activity Tolerance   Activity Tolerance Patient tolerated treatment well   Medical Staff Made Aware NAHOMY Pena   RUE Assessment   RUE Assessment WFL  (5/5 strength throughout)   LUE Assessment   LUE Assessment WFL  (5/5 strength throughout; pt concerned about IV in elbow)   Hand Function   Gross Motor Coordination Functional   Fine Motor Coordination Impaired  (slow movements)   Vision-Basic Assessment   Current Vision Wears glasses all the time   Vision - Complex Assessment   Acuity Able to read clock/calendar on wall without difficulty   Cognition   Overall Cognitive Status Impaired   Arousal/Participation Alert; Cooperative   Attention Attends with cues to redirect   Orientation Level Oriented to person;Oriented to place;Oriented to situation;Disoriented to time   Memory Decreased recall of recent events;Decreased short term memory;Decreased long term memory   Following Commands Follows one step commands with increased time or repetition   Comments Pt slow to respond to questions and commands   Assessment   Limitation Decreased ADL status; Decreased UE strength;Decreased Safe judgement during ADL;Decreased cognition;Decreased endurance;Decreased self-care trans;Decreased high-level ADLs; Decreased fine motor control   Prognosis Good   Assessment Patient is a 62 y o  male admitted to [address] [place] [address] on 1/14/2020 due to Generalized weakness  Comorbidities affecting pt's physical performance at time of assessment include CKD, HTN, h/o stroke and dementia  Patient has active OT orders and activity orders for Up with assistance  PTA pt living with girlfriend in a townhouse, with bed/bath up a full flight of stairs  Pt reports using a cane at baseline  I with ADLs requires A with IADLs  (-)drives, +falls  At the time of evaluation patient currently requires (S)-min A for overall ADLs, and min A for functional mobility  The following deficits affected patient's occupational performance weakness, decreased functional strength, decreased functional balance, decreased activity tolerance, decreased safety awareness, impaired attention, impaired sequencing and impaired problem solving  Patient would benefit from skilled OT services while in the hospital to address above deficits  Occupational performance areas to be addressed include ADL retraining, bed mobility, functional transfer training, endurance training, cognitive reorientation, patient/family training, compensatory technique education, activity engagement and activity tolerance in order to maximize patient's level of function  From OT standpoint recommend Short term rehab upon D/C  OT continue to follow pt 3-5x/week to address the following goals  Goals   Patient Goals "I'm getting out of here"    Short Term Goal #1 Patient will tolerate therapeutic activities for greater than 15 min, in order to increase tolerance for functional activities  Short Term Goal #2  Patient will perform functional transfers with Mod I to/from all surfaces using DME as needed   Short Term Goal  Patient will follow multistep instructions with no cuing to increase cognitive function and independence with tasks    Long Term Goal #1 Patient will complete UB/LB ADLs w/ mod I using AE and AD as needed   Long Term Goal #2 Patient will complete toileting w/ mod I w/ G hygiene/thoroughness   Plan   Treatment Interventions ADL retraining;Functional transfer training;UE strengthening/ROM; Endurance training;Cognitive reorientation;Patient/family training;Equipment evaluation/education; Compensatory technique education; Activityengagement; Energy conservation   Goal Expiration Date 01/27/20   OT Treatment Day 0   OT Frequency 3-5x/wk   Recommendation   OT Discharge Recommendation Short Term Rehab   OT - OK to Discharge   (to rehab when medically stable)   Barthel Index   Feeding 10   Bathing 0   Grooming Score 5   Dressing Score 5   Bladder Score 10   Bowels Score 10   Toilet Use Score 5   Transfers (Bed/Chair) Score 10   Mobility (Level Surface) Score 0   Stairs Score 0   Barthel Index Score 55      Pt will benefit from continued OT services in order to maximize independence with ADL performance, functional mobility with RW, and improve overall endurance/strength required to complete functional tasks in preparation for discharge      At the end of the session, all needs met and pt seated in bedside chair, LEs elevated , chair alarm activated and Call bell within reach    SAIRA Willoughby/L

## 2020-01-17 NOTE — ASSESSMENT & PLAN NOTE
· Patient gives history of 2-3 weeks of intermittent chest tightness and worsened on Sunday      · Patient was evaluated by PCP and noted to have slightly elevated troponin as into the emergency room  · Echocardiogram shows no regional wall motion abnormalities and normal EF  · Likely musculoskeletal chest pain- now resolved

## 2020-01-17 NOTE — INCIDENTAL FINDINGS
The following findings require follow up:  Radiographic finding  · Ascending abdominal aortic aneurysm without evidence of dissection  Follow up with PCP  · Stable left adrenal adenoma  Follow up with PCP  · Right thyroid nodule measuring 11 x 9 mm  No follow up needed  · Slightly disproportionate ventriculomegaly to sulcal prominence  Follow up with neurology      Follow up should be done within 2 week(s)    Please notify the following clinician to assist with the follow up:   Dr Lisa Allen

## 2020-01-17 NOTE — PLAN OF CARE
Problem: OCCUPATIONAL THERAPY ADULT  Goal: Performs self-care activities at highest level of function for planned discharge setting  See evaluation for individualized goals  Description  Treatment Interventions: ADL retraining, Functional transfer training, UE strengthening/ROM, Endurance training, Cognitive reorientation, Patient/family training, Equipment evaluation/education, Compensatory technique education, Activityengagement, Energy conservation          See flowsheet documentation for full assessment, interventions and recommendations  Note:   Limitation: Decreased ADL status, Decreased UE strength, Decreased Safe judgement during ADL, Decreased cognition, Decreased endurance, Decreased self-care trans, Decreased high-level ADLs, Decreased fine motor control  Prognosis: Good  Assessment: Patient is a 62 y o  male admitted to [address] [place] [address] on 1/14/2020 due to Generalized weakness  Comorbidities affecting pt's physical performance at time of assessment include CKD, HTN, h/o stroke and dementia  Patient has active OT orders and activity orders for Up with assistance  PTA pt living with girlfriend in a townhouse, with bed/bath up a full flight of stairs  Pt reports using a cane at baseline  I with ADLs requires A with IADLs  (-)drives, +falls  At the time of evaluation patient currently requires (S)-min A for overall ADLs, and min A for functional mobility  The following deficits affected patient's occupational performance weakness, decreased functional strength, decreased functional balance, decreased activity tolerance, decreased safety awareness, impaired attention, impaired sequencing and impaired problem solving  Patient would benefit from skilled OT services while in the hospital to address above deficits   Occupational performance areas to be addressed include ADL retraining, bed mobility, functional transfer training, endurance training, cognitive reorientation, patient/family training, compensatory technique education, activity engagement and activity tolerance in order to maximize patient's level of function  From OT standpoint recommend Short term rehab upon D/C  OT continue to follow pt 3-5x/week to address the following goals        OT Discharge Recommendation: Short Term Rehab  OT - OK to Discharge: (to rehab when medically stable)

## 2020-01-18 NOTE — NURSING NOTE
Reviewed d/c instructions and paperwork sent with transport team   All belongings with pt and no written prescription    Report called to [facility]

## 2020-02-05 ENCOUNTER — TRANSCRIBE ORDERS (OUTPATIENT)
Dept: ADMINISTRATIVE | Facility: HOSPITAL | Age: 65
End: 2020-02-05

## 2020-02-05 DIAGNOSIS — I70.213 ATHEROSCLEROSIS OF NATIVE ARTERY OF BOTH LOWER EXTREMITIES WITH INTERMITTENT CLAUDICATION (HCC): ICD-10-CM

## 2020-02-05 DIAGNOSIS — I67.2 CEREBRAL ATHEROSCLEROSIS: ICD-10-CM

## 2020-02-05 DIAGNOSIS — R93.1 ABNORMAL ECHOCARDIOGRAM: Primary | ICD-10-CM

## 2020-02-05 DIAGNOSIS — R06.02 SHORTNESS OF BREATH: ICD-10-CM

## 2020-02-17 ENCOUNTER — HOSPITAL ENCOUNTER (OUTPATIENT)
Dept: NUCLEAR MEDICINE | Facility: HOSPITAL | Age: 65
Discharge: HOME | End: 2020-02-17
Payer: MEDICARE

## 2020-02-17 ENCOUNTER — HOSPITAL ENCOUNTER (OUTPATIENT)
Dept: NON INVASIVE DIAGNOSTICS | Facility: HOSPITAL | Age: 65
Discharge: HOME | End: 2020-02-17
Payer: MEDICARE

## 2020-02-17 DIAGNOSIS — I70.213 ATHEROSCLEROSIS OF NATIVE ARTERY OF BOTH LOWER EXTREMITIES WITH INTERMITTENT CLAUDICATION (HCC): ICD-10-CM

## 2020-02-17 DIAGNOSIS — R06.02 SHORTNESS OF BREATH: ICD-10-CM

## 2020-02-17 DIAGNOSIS — I67.2 CEREBRAL ATHEROSCLEROSIS: ICD-10-CM

## 2020-02-17 DIAGNOSIS — R93.1 ABNORMAL ECHOCARDIOGRAM: ICD-10-CM

## 2020-02-17 LAB
ARRHY DURING EX: NORMAL
CHEST PAIN STATEMENT: NORMAL
MAX DIASTOLIC BP: 98 MMHG
MAX HEART RATE: 103 BPM
MAX PREDICTED HEART RATE: 157 BPM
MAX. SYSTOLIC BP: 163 MMHG
PROTOCOL NAME: NORMAL
REASON FOR TERMINATION: NORMAL
TARGET HR FORMULA: NORMAL
TIME IN EXERCISE PHASE: NORMAL

## 2020-02-17 PROCEDURE — 93016 CV STRESS TEST SUPVJ ONLY: CPT | Performed by: INTERNAL MEDICINE

## 2020-02-17 PROCEDURE — A9502 TC99M TETROFOSMIN: HCPCS

## 2020-02-17 PROCEDURE — 78452 HT MUSCLE IMAGE SPECT MULT: CPT | Performed by: INTERNAL MEDICINE

## 2020-02-17 PROCEDURE — 93017 CV STRESS TEST TRACING ONLY: CPT

## 2020-02-17 PROCEDURE — 78452 HT MUSCLE IMAGE SPECT MULT: CPT

## 2020-02-17 PROCEDURE — 93018 CV STRESS TEST I&R ONLY: CPT | Performed by: INTERNAL MEDICINE

## 2020-02-17 RX ADMIN — REGADENOSON 0.4 MG: 0.08 INJECTION, SOLUTION INTRAVENOUS at 10:07

## 2022-11-21 ENCOUNTER — HOSPITAL ENCOUNTER (OUTPATIENT)
Dept: CT IMAGING | Facility: HOSPITAL | Age: 67
Discharge: HOME | End: 2022-11-21

## 2022-11-21 DIAGNOSIS — I71.20 THORACIC AORTIC ANEURYSM (TAA), UNSPECIFIED PART, UNSPECIFIED WHETHER RUPTURED: ICD-10-CM
